# Patient Record
Sex: FEMALE | Race: WHITE | NOT HISPANIC OR LATINO | Employment: OTHER | URBAN - METROPOLITAN AREA
[De-identification: names, ages, dates, MRNs, and addresses within clinical notes are randomized per-mention and may not be internally consistent; named-entity substitution may affect disease eponyms.]

---

## 2017-01-11 RX ORDER — HYDROCODONE BITARTRATE AND ACETAMINOPHEN 10; 325 MG/1; MG/1
TABLET ORAL
Qty: 90 TABLET | Refills: 0 | Status: CANCELLED | OUTPATIENT
Start: 2017-01-11

## 2017-01-11 NOTE — TELEPHONE ENCOUNTER
----- Message from RT Thien sent at 1/10/2017  3:54 PM CST -----  Contact: pt    pt , requesting medication refill on [place], beryl.

## 2017-01-11 NOTE — TELEPHONE ENCOUNTER
----- Message from Isis Maynard sent at 1/11/2017 10:21 AM CST -----  Contact: kai   Wants pain medication, and any and all others that need refilling   Call back      .  [facility] [place] [place] - [address]  [address] [place] [address]  Phone: [phone] Fax: [phone]

## 2017-01-12 RX ORDER — HYDROCODONE BITARTRATE AND ACETAMINOPHEN 10; 325 MG/1; MG/1
1 TABLET ORAL 3 TIMES DAILY
Qty: 90 TABLET | Refills: 0 | Status: SHIPPED | OUTPATIENT
Start: 2017-01-12 | End: 2017-01-26 | Stop reason: SDUPTHER

## 2017-01-26 ENCOUNTER — OFFICE VISIT (OUTPATIENT)
Dept: FAMILY MEDICINE | Facility: CLINIC | Age: 57
End: 2017-01-26
Payer: MEDICARE

## 2017-01-26 VITALS
HEART RATE: 56 BPM | DIASTOLIC BLOOD PRESSURE: 74 MMHG | BODY MASS INDEX: 32.95 KG/M2 | SYSTOLIC BLOOD PRESSURE: 126 MMHG | HEIGHT: 65 IN | WEIGHT: 197.75 LBS

## 2017-01-26 DIAGNOSIS — G89.4 CHRONIC PAIN SYNDROME: Primary | ICD-10-CM

## 2017-01-26 DIAGNOSIS — I10 ESSENTIAL HYPERTENSION: ICD-10-CM

## 2017-01-26 DIAGNOSIS — E03.9 HYPOTHYROIDISM, UNSPECIFIED TYPE: ICD-10-CM

## 2017-01-26 DIAGNOSIS — F32.A DEPRESSION, UNSPECIFIED DEPRESSION TYPE: ICD-10-CM

## 2017-01-26 PROCEDURE — 99213 OFFICE O/P EST LOW 20 MIN: CPT | Mod: PBBFAC,PO | Performed by: FAMILY MEDICINE

## 2017-01-26 PROCEDURE — 99999 PR PBB SHADOW E&M-EST. PATIENT-LVL III: CPT | Mod: PBBFAC,,, | Performed by: FAMILY MEDICINE

## 2017-01-26 PROCEDURE — 99214 OFFICE O/P EST MOD 30 MIN: CPT | Mod: S$PBB,,, | Performed by: FAMILY MEDICINE

## 2017-01-26 RX ORDER — TRAZODONE HYDROCHLORIDE 50 MG/1
100 TABLET ORAL NIGHTLY
Qty: 60 TABLET | Refills: 3 | Status: SHIPPED | OUTPATIENT
Start: 2017-01-26 | End: 2017-06-02 | Stop reason: SDUPTHER

## 2017-01-26 RX ORDER — HYDROCODONE BITARTRATE AND ACETAMINOPHEN 10; 325 MG/1; MG/1
1 TABLET ORAL 3 TIMES DAILY
Qty: 90 TABLET | Refills: 0 | Status: SHIPPED | OUTPATIENT
Start: 2017-02-12 | End: 2017-02-07 | Stop reason: SDUPTHER

## 2017-01-26 NOTE — MR AVS SNAPSHOT
[facility]  [address] [place] [address]  Phone: [phone]  Fax: [phone]                  Mindi Villalta   2017 2:40 PM   Office Visit    Description:  Female : 1960   Provider:  Fracisco Caldwell MD   Department:  [facility]           Reason for Visit     Hypertension           Diagnoses this Visit        Comments    Hypothyroidism, unspecified type    -  Primary     Chronic pain syndrome         Essential hypertension         Depression, unspecified depression type                To Do List           Future Appointments        Provider Department Dept Phone    2017 9:20 AM Fracisco Caldwell MD [facility] [phone]      Goals (5 Years of Data)     None      Follow-Up and Disposition     Return in about 3 months (around 2017).       These Medications        Disp Refills Start End    trazodone (DESYREL) 50 MG tablet 60 tablet 3 2017     Take 2 tablets (100 mg total) by mouth every evening. - Oral    Pharmacy: [facility] [place] - [address] Ph #: [phone]       hydrocodone-acetaminophen 10-325mg (NORCO)  mg Tab 90 tablet 0 2017     Take 1 tablet by mouth 3 (three) times daily. - Oral    Pharmacy: [facility] [place] - [address] Ph #: [phone]         [facility]     [facility] Nurse Care [facility] -  Assistance  Registered nurses in the [facility] provide clinical advisement, health education, appointment booking, and other advisory services.  Call for this free service at [phone].             Medications           Message regarding Medications     Verify the changes and/or additions to your medication regime listed below are the same as discussed with your clinician today.  If any of these changes or additions are incorrect, please notify your healthcare provider.        CHANGE how you are taking  these medications     Start Taking Instead of    trazodone (DESYREL) 50 MG tablet trazodone (DESYREL) 50 MG tablet    Dosage:  Take 2 tablets (100 mg total) by mouth every evening. Dosage:  TAKE ONE TABLET BY MOUTH EVERY EVENING    Reason for Change:  Reorder            Verify that the below list of medications is an accurate representation of the medications you are currently taking.  If none reported, the list may be blank. If incorrect, please contact your healthcare provider. Carry this list with you in case of emergency.           Current Medications     buPROPion (WELLBUTRIN XL) 300 MG 24 hr tablet TAKE ONE TABLET BY MOUTH EVERY DAY    diclofenac sodium (VOLTAREN) 1 % Gel APPLY 4 MG TOPICALLY TO AFFECTED AREA FOUR TIMES A DAY    escitalopram oxalate (LEXAPRO) 20 MG tablet TAKE ONE TABLET BY MOUTH EVERY DAY FOR MOOD STABILIZER    hydrocodone-acetaminophen 10-325mg (NORCO)  mg Tab Starting on Feb 12, 2017. Take 1 tablet by mouth 3 (three) times daily.    ibuprofen (ADVIL,MOTRIN) 600 MG tablet TAKE ONE TABLET BY MOUTH EVERY 6 HOURS AS NEEDED FOR PAIN AND INFLAMMATION    levothyroxine (SYNTHROID) 100 MCG tablet Take 1 tablet (100 mcg total) by mouth once daily.    multivitamin capsule Take 1 capsule by mouth once daily.    NEXIUM 40 mg capsule TAKE ONE CAPSULE BY MOUTH EVERY DAY BEFORE BREAKFAST    pravastatin (PRAVACHOL) 20 MG tablet TAKE ONE TABLET BY MOUTH IN THE EVENING FOR CHOLESTEROL    TENS units Humaira 1 Units by Misc.(Non-Drug; Combo Route) route 4 (four) times daily as needed.    tramadol (ULTRAM) 50 mg tablet TAKE ONE TABLET BY MOUTH EVERY 6 HOURS AS NEEDED    trazodone (DESYREL) 50 MG tablet Take 2 tablets (100 mg total) by mouth every evening.    triamcinolone acetonide 0.1% (KENALOG) 0.1 % cream APPLY TO AFFECTED AREA TWO TIMES A DAY           Clinical Reference Information           Vital Signs - Last Recorded  Most recent update: 1/26/2017  2:59 PM by Cristal Seo LPN    BP Pulse Ht Wt BMI  "   126/74 (!) 56 5' 5" (1.651 m) 89.7 kg (197 lb 12 oz) 32.91 kg/m2      Blood Pressure          Most Recent Value    BP  126/74      Allergies as of 1/26/2017     Pcn [Penicillins]      Immunizations Administered on Date of Encounter - 1/26/2017     None      Orders Placed During Today's Visit     Future Labs/Procedures Expected by Expires    TSH  7/25/2017 1/26/2018      Smoking Cessation     If you would like to quit smoking:   You may be eligible for free services if you are a [place] resident and started smoking cigarettes before September 1, 1988.  Call the Smoking Cessation Trust (SCT) toll free at ([phone] or [phone].   Call 2-915-QUIT-NOW if you do not meet the above criteria.            "

## 2017-02-04 NOTE — PROGRESS NOTES
A pleasant 56-year-old female here today.  She has got a history of chronic   pain, hypertension, depression and hypothyroidism.  She does continue to smoke.    She has signed a pain contract with us and has done well with her controlled   medications.  She is not homicidal or suicidal.  She has been under more stress   than normal recently.  She is due for a recheck TSH because of her   hypothyroidism.    PAST MEDICAL, SURGICAL AND SOCIAL HISTORY:  Reviewed.    PHYSICAL EXAMINATION:  VITAL SIGNS:  Normal.  GENERAL:  A pleasant female who looks depressed today.  CHEST:  Clear.  MUSCULOSKELETAL:  No cervical or thoracic spine tenderness.  She had pain with   range of motion of her neck and upper and lower extremities.  No acute joint   swelling or acute rash.  NECK:  No thyroidal tenderness.    ASSESSMENT:  Chronic pain, hypertension, depression and hypothyroidism.    PLAN:  We will check TSH.  We will renew her controlled medications   appropriately.  She will need periodic urine toxicology screens.  She has been   very faithful with her medications so far.  We discussed caffeine, sleep,   exercise.  We discussed health maintenance and cancer screening, which she is   not interested in at this time.      BRANDT  dd: 02/04/2017 14:15:52 (CST)  td: 02/04/2017 19:22:59 (CST)  Doc ID   #[id number]  Job ID #[id number]    CC:

## 2017-02-08 NOTE — TELEPHONE ENCOUNTER
----- Message from Yin Perry sent at 2/8/2017  3:33 PM CST -----  Contact: self: [phone]  Patient says she was not able to have her prescription of Norco filled because it was not prescribed until 2/12/17. She says she needs it now because her mom is sick and she is going out of town. Pharmacy needs office to okay it today.      [facility] [place] - [address]  [address] [place] [address]  Phone: [phone] Fax: [phone]

## 2017-02-09 RX ORDER — ESOMEPRAZOLE MAGNESIUM 40 MG/1
CAPSULE, DELAYED RELEASE ORAL
Qty: 30 CAPSULE | Refills: 2 | Status: SHIPPED | OUTPATIENT
Start: 2017-02-09 | End: 2023-02-28

## 2017-02-09 RX ORDER — HYDROCODONE BITARTRATE AND ACETAMINOPHEN 10; 325 MG/1; MG/1
1 TABLET ORAL 3 TIMES DAILY
Qty: 90 TABLET | Refills: 0 | Status: SHIPPED | OUTPATIENT
Start: 2017-02-12 | End: 2017-04-11 | Stop reason: SDUPTHER

## 2017-02-09 RX ORDER — HYDROCODONE BITARTRATE AND ACETAMINOPHEN 10; 325 MG/1; MG/1
TABLET ORAL
Qty: 90 TABLET | Refills: 0 | Status: SHIPPED | OUTPATIENT
Start: 2017-02-09 | End: 2017-03-06 | Stop reason: SDUPTHER

## 2017-02-28 RX ORDER — IBUPROFEN 600 MG/1
TABLET ORAL
Qty: 60 TABLET | Refills: 3 | Status: SHIPPED | OUTPATIENT
Start: 2017-02-28 | End: 2017-07-03 | Stop reason: SDUPTHER

## 2017-03-07 NOTE — TELEPHONE ENCOUNTER
----- Message from Yin Perry sent at 3/7/2017  2:48 PM CST -----  Patient is calling to get a refill of her [place] medication sent to:      [facility] - [address]  [address] [place] [address]  Phone: [phone] Fax: [phone]    Patient asks that you call it in before 3/9/17 so that she may  all of her prescriptions at one time. Please call at [phone] when completed.

## 2017-03-08 RX ORDER — HYDROCODONE BITARTRATE AND ACETAMINOPHEN 10; 325 MG/1; MG/1
TABLET ORAL
Qty: 90 TABLET | Refills: 0 | Status: SHIPPED | OUTPATIENT
Start: 2017-03-08 | End: 2017-07-12

## 2017-04-07 RX ORDER — TRAMADOL HYDROCHLORIDE 50 MG/1
TABLET ORAL
Qty: 60 TABLET | Refills: 5 | Status: CANCELLED | OUTPATIENT
Start: 2017-04-07

## 2017-04-07 NOTE — TELEPHONE ENCOUNTER
----- Message from Yun Huggins sent at 4/7/2017  9:20 AM CDT -----  Contact:  call  [phone]    Calling to  Get a  Refill  On  [place] //// please call   [facility] - [address]  [address]  [place] [address]  Phone: [phone] Fax: [phone]

## 2017-04-11 RX ORDER — TRAMADOL HYDROCHLORIDE 50 MG/1
50 TABLET ORAL EVERY 6 HOURS PRN
Qty: 60 TABLET | Refills: 0 | Status: SHIPPED | OUTPATIENT
Start: 2017-04-11 | End: 2017-05-08 | Stop reason: SDUPTHER

## 2017-04-11 RX ORDER — HYDROCODONE BITARTRATE AND ACETAMINOPHEN 10; 325 MG/1; MG/1
1 TABLET ORAL 3 TIMES DAILY
Qty: 90 TABLET | Refills: 0 | Status: SHIPPED | OUTPATIENT
Start: 2017-04-11 | End: 2017-04-27 | Stop reason: SDUPTHER

## 2017-04-12 RX ORDER — PRAVASTATIN SODIUM 20 MG/1
TABLET ORAL
Qty: 30 TABLET | Refills: 6 | Status: SHIPPED | OUTPATIENT
Start: 2017-04-12 | End: 2017-06-02 | Stop reason: SDUPTHER

## 2017-04-12 RX ORDER — HYDROCODONE BITARTRATE AND ACETAMINOPHEN 10; 325 MG/1; MG/1
TABLET ORAL
Qty: 90 TABLET | Refills: 0 | Status: SHIPPED | OUTPATIENT
Start: 2017-04-12 | End: 2017-07-12

## 2017-04-27 ENCOUNTER — OFFICE VISIT (OUTPATIENT)
Dept: FAMILY MEDICINE | Facility: CLINIC | Age: 57
End: 2017-04-27
Payer: MEDICARE

## 2017-04-27 VITALS
OXYGEN SATURATION: 98 % | SYSTOLIC BLOOD PRESSURE: 128 MMHG | HEART RATE: 60 BPM | WEIGHT: 195.13 LBS | HEIGHT: 65 IN | DIASTOLIC BLOOD PRESSURE: 76 MMHG | BODY MASS INDEX: 32.51 KG/M2

## 2017-04-27 DIAGNOSIS — G89.4 CHRONIC PAIN SYNDROME: ICD-10-CM

## 2017-04-27 DIAGNOSIS — F32.A DEPRESSION, UNSPECIFIED DEPRESSION TYPE: ICD-10-CM

## 2017-04-27 DIAGNOSIS — I10 ESSENTIAL HYPERTENSION: ICD-10-CM

## 2017-04-27 DIAGNOSIS — E03.9 HYPOTHYROIDISM, UNSPECIFIED TYPE: Primary | ICD-10-CM

## 2017-04-27 PROCEDURE — 99999 PR PBB SHADOW E&M-EST. PATIENT-LVL III: CPT | Mod: PBBFAC,,, | Performed by: FAMILY MEDICINE

## 2017-04-27 PROCEDURE — 99213 OFFICE O/P EST LOW 20 MIN: CPT | Mod: PBBFAC,PO | Performed by: FAMILY MEDICINE

## 2017-04-27 PROCEDURE — 99214 OFFICE O/P EST MOD 30 MIN: CPT | Mod: S$PBB,,, | Performed by: FAMILY MEDICINE

## 2017-04-27 RX ORDER — HYDROCODONE BITARTRATE AND ACETAMINOPHEN 10; 325 MG/1; MG/1
1 TABLET ORAL 3 TIMES DAILY
Qty: 90 TABLET | Refills: 0 | Status: SHIPPED | OUTPATIENT
Start: 2017-05-08 | End: 2017-06-02 | Stop reason: SDUPTHER

## 2017-04-27 RX ORDER — CITALOPRAM 20 MG/1
20 TABLET, FILM COATED ORAL DAILY
Qty: 30 TABLET | Refills: 11 | Status: SHIPPED | OUTPATIENT
Start: 2017-04-27 | End: 2017-05-27

## 2017-05-08 RX ORDER — BUPROPION HYDROCHLORIDE 300 MG/1
TABLET ORAL
Qty: 30 TABLET | Refills: 6 | Status: SHIPPED | OUTPATIENT
Start: 2017-05-08 | End: 2022-04-28

## 2017-05-08 NOTE — PROGRESS NOTES
Pt bonita note to be done HISTORY OF PRESENT ILLNESS:  A 57-year-old female here today.  She has   significant depression and anxiety.  She is not homicidal or suicidal.  She does   continue to smoke.  We addressed her family issues.  She would like to try   something besides Lexapro.  She is already on Wellbutrin- and Lexapro 20.    She has chronic pain, hypothyroidism and hypertension.    PHYSICAL EXAMINATION:  A pleasant female, obviously depressed, but not homicidal   or suicidal.  Pleasant and polite.  Her chest is clear.  No cervical, thoracic   or lumbar spine tenderness.  She had pain with range of motion of her neck,   back, upper and lower extremities consistent probably with fibromyalgia.    ASSESSMENT:  Hypothyroidism, chronic pain, hypertension and depression.    PLAN:  We will change the Lexapro to Celexa 20 mg.  We discussed caffeine and   exercise.  Counseling as needed.  She is signed a controlled medication   contract.  Hopefully, she will improve with the Celexa.      NETTA/SUSAN  dd: 05/09/2017 07:49:15 (CDT)  td: 05/09/2017 12:15:18 (CDT)  Doc ID   #[id number]  Job ID #[id number]    CC:

## 2017-05-09 RX ORDER — TRAMADOL HYDROCHLORIDE 50 MG/1
TABLET ORAL
Qty: 60 TABLET | Refills: 0 | Status: SHIPPED | OUTPATIENT
Start: 2017-05-09 | End: 2021-03-22 | Stop reason: ALTCHOICE

## 2017-05-11 ENCOUNTER — OFFICE VISIT (OUTPATIENT)
Dept: PODIATRY | Facility: CLINIC | Age: 57
End: 2017-05-11
Payer: MEDICARE

## 2017-05-11 VITALS — WEIGHT: 194.69 LBS | HEIGHT: 65 IN | BODY MASS INDEX: 32.44 KG/M2

## 2017-05-11 DIAGNOSIS — M76.71 PERONEAL TENDINITIS OF LOWER LEG, RIGHT: ICD-10-CM

## 2017-05-11 DIAGNOSIS — M79.672 FOOT PAIN, BILATERAL: Primary | ICD-10-CM

## 2017-05-11 DIAGNOSIS — M77.8 EXTENSOR TENDINITIS OF FOOT: ICD-10-CM

## 2017-05-11 DIAGNOSIS — M79.671 FOOT PAIN, BILATERAL: Primary | ICD-10-CM

## 2017-05-11 DIAGNOSIS — M20.42 HAMMER TOES OF BOTH FEET: ICD-10-CM

## 2017-05-11 DIAGNOSIS — M20.41 HAMMER TOES OF BOTH FEET: ICD-10-CM

## 2017-05-11 DIAGNOSIS — M21.41 PES PLANUS OF BOTH FEET: ICD-10-CM

## 2017-05-11 DIAGNOSIS — M21.42 PES PLANUS OF BOTH FEET: ICD-10-CM

## 2017-05-11 PROCEDURE — 99214 OFFICE O/P EST MOD 30 MIN: CPT | Mod: S$PBB,,, | Performed by: PODIATRIST

## 2017-05-11 PROCEDURE — 99213 OFFICE O/P EST LOW 20 MIN: CPT | Mod: PBBFAC,PO | Performed by: PODIATRIST

## 2017-05-11 PROCEDURE — 99999 PR PBB SHADOW E&M-EST. PATIENT-LVL III: CPT | Mod: PBBFAC,,, | Performed by: PODIATRIST

## 2017-05-11 RX ORDER — METHYLPREDNISOLONE 4 MG/1
TABLET ORAL
Qty: 1 PACKAGE | Refills: 0 | Status: SHIPPED | OUTPATIENT
Start: 2017-05-11 | End: 2017-06-01

## 2017-05-11 NOTE — MR AVS SNAPSHOT
[facility]  [address] [place] [address]  Phone: [phone]                  Mindi Villalta   2017 10:00 AM   Office Visit    Description:  Female : 1960   Provider:  Juan Bill DPM   Department:  [facility]           Reason for Visit     Foot Pain           Diagnoses this Visit        Comments    Foot pain, bilateral    -  Primary     Pes planus of both feet         Hammer toes of both feet         Extensor tendinitis of foot         Peroneal tendinitis of lower leg, right                To Do List           Future Appointments        Provider Department Dept Phone    2017 8:20 AM Juan Bill DPM [facility] [phone]    2017 9:30 AM Poonam Carbajal NP [facility] [phone]      Goals (5 Years of Data)     None      Follow-Up and Disposition     Return in about 2 months (around 2017).       These Medications        Disp Refills Start End    methylPREDNISolone (MEDROL DOSEPACK) 4 mg tablet 1 Package 0 2017    use as directed    Pharmacy: [facility] - [place] - [address] Ph #: [phone]         [facility]     [facility] Nurse Care [facility] -  Assistance  Unless otherwise directed by your provider, please contact [facility], our nurse care line that is available for  assistance.     Registered nurses in the [facility] provide: appointment scheduling, clinical advisement, health education, and other advisory services.  Call: [phone] (toll free)               Medications           Message regarding Medications     Verify the changes and/or additions to your medication regime listed below are the same as discussed with your clinician today.  If any of these changes or additions are incorrect, please notify your healthcare provider.        START taking these NEW medications        Refills    methylPREDNISolone (MEDROL  DOSEPACK) 4 mg tablet 0    Sig: use as directed    Class: Normal           Verify that the below list of medications is an accurate representation of the medications you are currently taking.  If none reported, the list may be blank. If incorrect, please contact your healthcare provider. Carry this list with you in case of emergency.           Current Medications     buPROPion (WELLBUTRIN XL) 300 MG 24 hr tablet TAKE ONE TABLET BY MOUTH EVERY DAY    citalopram (CELEXA) 20 MG tablet Take 1 tablet (20 mg total) by mouth once daily.    diclofenac sodium (VOLTAREN) 1 % Gel APPLY 4 MG TOPICALLY TO AFFECTED AREA FOUR TIMES A DAY    escitalopram oxalate (LEXAPRO) 20 MG tablet TAKE ONE TABLET BY MOUTH EVERY DAY FOR MOOD STABILIZER    hydrocodone-acetaminophen 10-325mg (NORCO)  mg Tab TAKE ONE TABLET BY MOUTH THREE TIMES A DAY    hydrocodone-acetaminophen 10-325mg (NORCO)  mg Tab TAKE 1 TABLET BY MOUTH THREE TIMES A DAY    hydrocodone-acetaminophen 10-325mg (NORCO)  mg Tab Take 1 tablet by mouth 3 (three) times daily.    ibuprofen (ADVIL,MOTRIN) 600 MG tablet TAKE ONE TABLET BY MOUTH EVERY 6 HOURS AS NEEDED FOR PAIN AND INFLAMMATION    levothyroxine (SYNTHROID) 100 MCG tablet Take 1 tablet (100 mcg total) by mouth once daily.    multivitamin capsule Take 1 capsule by mouth once daily.    NEXIUM 40 mg capsule TAKE ONE CAPSULE BY MOUTH EVERY DAY BEFORE BREAKFAST    pravastatin (PRAVACHOL) 20 MG tablet TAKE ONE TABLET BY MOUTH EVERY EVENING FOR CHOLESTEROL    TENS units Humaira 1 Units by Misc.(Non-Drug; Combo Route) route 4 (four) times daily as needed.    tramadol (ULTRAM) 50 mg tablet TAKE ONE TABLET BY MOUTH EVERY 6 HOURS AS NEEDED    trazodone (DESYREL) 50 MG tablet Take 2 tablets (100 mg total) by mouth every evening.    triamcinolone acetonide 0.1% (KENALOG) 0.1 % cream APPLY TO AFFECTED AREA TWO TIMES A DAY    methylPREDNISolone (MEDROL DOSEPACK) 4 mg tablet use as directed           Clinical Reference  "Information           Your Vitals Were     Height Weight BMI          5' 5" (1.651 m) 88.3 kg (194 lb 10.7 oz) 32.39 kg/m2        Allergies as of 5/11/2017     Pcn [Penicillins]      Immunizations Administered on Date of Encounter - 5/11/2017     None      Instructions    Recommend applying ice to the affected foot up to 20 minutes daily.    Continue applying aspercream to the feet 2-4 times daily.    Continue taking ibuprofen to address inflammation.    Sandals: Vionic or Spenco    Avoid barefoot walking, flip flops, and sandals as this will exacerbate symptoms.       Smoking Cessation     If you would like to quit smoking:   You may be eligible for free services if you are a [place] resident and started smoking cigarettes before September 1, 1988.  Call the Smoking Cessation Trust ([facility]) toll free at [phone] or [phone] [phone].   Call 1-800-QUIT-NOW if you do not meet the above criteria.   Contact us via email: [email or web address]   View our website for more information: [email or web address]        Language Assistance Services     ATTENTION: Language assistance services are available, free of charge. Please call [phone].      ATENCIÓN: Si habla español, tiene a mclain disposición servicios gratuitos de asistencia lingüística. Llame al [phone].     CHÚ Ý: N?u b?n nói Ti?ng Vi?t, có các d?ch v? h? tr? ngôn ng? mi?n phí dành cho b?n. G?i s? [phone].         [facility] complies with applicable Federal civil rights laws and does not discriminate on the basis of race, color, national origin, age, disability, or sex.        "

## 2017-05-11 NOTE — PATIENT INSTRUCTIONS
Recommend applying ice to the affected foot up to 20 minutes daily.    Continue applying aspercream to the feet 2-4 times daily.    Continue taking ibuprofen to address inflammation.    Sandals: Vionic or Spenco    Avoid barefoot walking, flip flops, and sandals as this will exacerbate symptoms.

## 2017-05-11 NOTE — PROGRESS NOTES
Subjective:      Patient ID: Mindi Villalta is a 57 y.o. female.    Chief Complaint: Foot Pain (Left  top of foot pain, Rt foot side of foot painful)  Patient presents to clinic with the chief complaint of pain in the Rt. Lateral foot and Lt. Dorsal foot for months.  States that pain symptoms are exacerbated with direct weight bearing and progressively worsen throughout the day.  Symptoms are alleviated only with rest.  Currently describes pain as sharp and rates as an 8/10.  Relates primarily walking either barefoot or in flip flops, as she is unable to tolerate wearing closed toe shoes.  Denies injury to bilateral foot.  Denies any additional pedal complaints.      Past Medical History:   Diagnosis Date    Arthritis     back, chronic, wears TENS unit    Bradycardia July 2014    improved when thyroid med increased    Colon cancer screening 1/12/2015    Depression     GERD (gastroesophageal reflux disease)     occasional    Hyperlipidemia     Hypertension     on no medication for it    Smoker     Thyroid disease     hypothyroidism       Past Surgical History:   Procedure Laterality Date    COLONOSCOPY  1/12/2015    FOOT SURGERY      left foot great toe, bunion; right foot bunion 7/2014    FRACTURE SURGERY      right elbow    TONSILLECTOMY      TUBAL LIGATION         Family History   Problem Relation Age of Onset    Diabetes Mother     Arthritis Father     Heart disease Father     Stroke Father     Diabetes Maternal Aunt        Social History     Social History    Marital status:      Spouse name: N/A    Number of children: N/A    Years of education: N/A     Social History Main Topics    Smoking status: Current Every Day Smoker     Packs/day: 0.50     Types: Cigarettes    Smokeless tobacco: Never Used      Comment: smoke a pack of cigarettes every 2 days    Alcohol use Yes      Comment: rarely    Drug use: Yes     Special: Hydrocodone    Sexual activity: Not Asked     Other Topics  Concern    None     Social History Narrative       Current Outpatient Prescriptions   Medication Sig Dispense Refill    buPROPion (WELLBUTRIN XL) 300 MG 24 hr tablet TAKE ONE TABLET BY MOUTH EVERY DAY 30 tablet 6    citalopram (CELEXA) 20 MG tablet Take 1 tablet (20 mg total) by mouth once daily. 30 tablet 11    diclofenac sodium (VOLTAREN) 1 % Gel APPLY 4 MG TOPICALLY TO AFFECTED AREA FOUR TIMES A  g 3    escitalopram oxalate (LEXAPRO) 20 MG tablet TAKE ONE TABLET BY MOUTH EVERY DAY FOR MOOD STABILIZER 30 tablet 11    hydrocodone-acetaminophen 10-325mg (NORCO)  mg Tab TAKE ONE TABLET BY MOUTH THREE TIMES A DAY 90 tablet 0    hydrocodone-acetaminophen 10-325mg (NORCO)  mg Tab TAKE 1 TABLET BY MOUTH THREE TIMES A DAY 90 tablet 0    hydrocodone-acetaminophen 10-325mg (NORCO)  mg Tab Take 1 tablet by mouth 3 (three) times daily. 90 tablet 0    ibuprofen (ADVIL,MOTRIN) 600 MG tablet TAKE ONE TABLET BY MOUTH EVERY 6 HOURS AS NEEDED FOR PAIN AND INFLAMMATION 60 tablet 3    levothyroxine (SYNTHROID) 100 MCG tablet Take 1 tablet (100 mcg total) by mouth once daily. 30 tablet 11    multivitamin capsule Take 1 capsule by mouth once daily.      NEXIUM 40 mg capsule TAKE ONE CAPSULE BY MOUTH EVERY DAY BEFORE BREAKFAST 30 capsule 2    pravastatin (PRAVACHOL) 20 MG tablet TAKE ONE TABLET BY MOUTH EVERY EVENING FOR CHOLESTEROL 30 tablet 6    TENS units Humaira 1 Units by Misc.(Non-Drug; Combo Route) route 4 (four) times daily as needed. 1 Device 0    tramadol (ULTRAM) 50 mg tablet TAKE ONE TABLET BY MOUTH EVERY 6 HOURS AS NEEDED 60 tablet 0    trazodone (DESYREL) 50 MG tablet Take 2 tablets (100 mg total) by mouth every evening. 60 tablet 3    triamcinolone acetonide 0.1% (KENALOG) 0.1 % cream APPLY TO AFFECTED AREA TWO TIMES A  Bottle 1    methylPREDNISolone (MEDROL DOSEPACK) 4 mg tablet use as directed 1 Package 0     No current facility-administered medications for this visit.         Review of patient's allergies indicates:   Allergen Reactions    Pcn [penicillins] Rash         Review of Systems   Constitution: Negative for chills and fever.   Cardiovascular: Negative for claudication and leg swelling.   Skin: Negative for color change and nail changes.   Musculoskeletal: Positive for arthritis, back pain, joint pain and myalgias.   Neurological: Negative for numbness and paresthesias.   Psychiatric/Behavioral: Negative for altered mental status.           Objective:      Physical Exam   Constitutional: She is oriented to person, place, and time. She appears well-developed and well-nourished. No distress.   Cardiovascular:   Pulses:       Dorsalis pedis pulses are 2+ on the right side, and 2+ on the left side.        Posterior tibial pulses are 2+ on the right side, and 2+ on the left side.   CFT <3 seconds bilateral.  Pedal hair growth present bilateral.   No varicosities noted bilateral.  No bilateral lower extremity edema.   Musculoskeletal: She exhibits tenderness. She exhibits no edema.   Muscle strength 5/5 in all muscle groups bilateral.   Bilateral pes planus foot type.  Lt. Hallux varus. Semi-rigid contracture of toes 2-5 bilateral.  Rectus alignment of the Rt. Hallux.  Pain with both active and passive extension of toes and with palpation of the extensor tendons about the Lt. Midfoot.  Pain with palpation of the Rt. Peroneus brevis tendon at insertion, however, no pain with active or passive eversion or inversion.    Neurological: She is oriented to person, place, and time. She has normal strength. No sensory deficit.   Light touch intact bilateral.     Skin: Skin is warm, dry and intact. No abrasion, no bruising, no burn, no ecchymosis, no laceration, no lesion, no petechiae and no rash noted. She is not diaphoretic. No cyanosis or erythema. No pallor. Nails show no clubbing.   Pedal skin has normal turgor, temperature, and texture bilateral.  Toenails x 10 appear normotrophic.   Cicatrix overlying the Rt. 1st ray.   Examination of the skin reveals no evidence of significant maceration, rashes, open lesions, suspicious appearing nevi or other concerning lesions.              Assessment:       Encounter Diagnoses   Name Primary?    Foot pain, bilateral Yes    Pes planus of both feet     Hammer toes of both feet     Extensor tendinitis of foot     Peroneal tendinitis of lower leg, right          Plan:       Mindi was seen today for foot pain.    Diagnoses and all orders for this visit:    Foot pain, bilateral    Pes planus of both feet    Hammer toes of both feet    Extensor tendinitis of foot  -     methylPREDNISolone (MEDROL DOSEPACK) 4 mg tablet; use as directed    Peroneal tendinitis of lower leg, right  -     methylPREDNISolone (MEDROL DOSEPACK) 4 mg tablet; use as directed      I counseled the patient on her conditions, their implications and medical management.    Advised to take an oral nsaid daily to help with inflammation.    Prescription written for a medrol dose pack.    Advised to apply voltaren gel to sites of tendonitis 3-4 times daily.    Explained that bilateral symptoms are likely due to lack of medial longitudinal arch support associated with her flat foot deformity.      Discussed avoidance of barefoot walking, flats, and flip flops as this will exacerbate symptoms.    Discussed wearing shoes with support and insoles to better control bilateral pronation x 4-6 weeks.      RTC in 2 months for follow up.    Juan Bill DPM

## 2017-05-15 RX ORDER — TRAMADOL HYDROCHLORIDE 50 MG/1
TABLET ORAL
Qty: 60 TABLET | Refills: 5 | Status: SHIPPED | OUTPATIENT
Start: 2017-05-15 | End: 2017-10-02 | Stop reason: SDUPTHER

## 2017-06-02 NOTE — TELEPHONE ENCOUNTER
----- Message from Maryjane Boateng sent at 6/2/2017 10:29 AM CDT -----  Contact: self  Patient is requesting a refill on Norco      Please send to    [facility] [facility] [place] - [address]  [address] [place] [address]  Phone: [phone] Fax: [phone]

## 2017-06-06 RX ORDER — PRAVASTATIN SODIUM 20 MG/1
TABLET ORAL
Qty: 30 TABLET | Refills: 1 | Status: SHIPPED | OUTPATIENT
Start: 2017-06-06 | End: 2019-07-21 | Stop reason: ALTCHOICE

## 2017-06-06 RX ORDER — HYDROCODONE BITARTRATE AND ACETAMINOPHEN 10; 325 MG/1; MG/1
1 TABLET ORAL 3 TIMES DAILY
Qty: 90 TABLET | Refills: 0 | Status: SHIPPED | OUTPATIENT
Start: 2017-06-06 | End: 2017-07-12

## 2017-06-06 RX ORDER — HYDROCODONE BITARTRATE AND ACETAMINOPHEN 10; 325 MG/1; MG/1
TABLET ORAL
Qty: 90 TABLET | Refills: 0 | Status: SHIPPED | OUTPATIENT
Start: 2017-06-06 | End: 2017-07-12 | Stop reason: SDUPTHER

## 2017-06-06 RX ORDER — TRAZODONE HYDROCHLORIDE 50 MG/1
TABLET ORAL
Qty: 60 TABLET | Refills: 3 | Status: SHIPPED | OUTPATIENT
Start: 2017-06-06 | End: 2022-06-29 | Stop reason: SDUPTHER

## 2017-07-03 RX ORDER — IBUPROFEN 600 MG/1
TABLET ORAL
Qty: 60 TABLET | Refills: 0 | Status: SHIPPED | OUTPATIENT
Start: 2017-07-03 | End: 2019-07-21 | Stop reason: ALTCHOICE

## 2017-07-03 RX ORDER — TRAMADOL HYDROCHLORIDE 50 MG/1
50 TABLET ORAL EVERY 6 HOURS PRN
Qty: 60 TABLET | Refills: 0 | OUTPATIENT
Start: 2017-07-03

## 2017-07-03 RX ORDER — HYDROCODONE BITARTRATE AND ACETAMINOPHEN 10; 325 MG/1; MG/1
1 TABLET ORAL 3 TIMES DAILY
Qty: 30 TABLET | Refills: 0 | OUTPATIENT
Start: 2017-07-03

## 2017-07-03 NOTE — TELEPHONE ENCOUNTER
----- Message from Otilia Kaufman sent at 7/3/2017 11:06 AM CDT -----  Contact: patient  Patient calling in regards to requesting a refill for [place] and Tramadol. Please call when sent in. Please advise.  Call back   Thanks!  [facility] [place] - [address]  [address] [place] [address]  Phone: [phone] Fax: [phone]

## 2017-07-05 RX ORDER — TRAMADOL HYDROCHLORIDE 50 MG/1
50 TABLET ORAL EVERY 6 HOURS PRN
Qty: 60 TABLET | Refills: 5 | OUTPATIENT
Start: 2017-07-05

## 2017-07-05 RX ORDER — HYDROCODONE BITARTRATE AND ACETAMINOPHEN 10; 325 MG/1; MG/1
1 TABLET ORAL 3 TIMES DAILY
Qty: 90 TABLET | Refills: 0 | OUTPATIENT
Start: 2017-07-05

## 2017-07-05 NOTE — TELEPHONE ENCOUNTER
"Spoke with pt, notified of reason for refill refusal, offered to attempt to reschedule to sooner appt. Pt is very upset about "the way things have been handled. I was told there would be no problem getting my refills and finding a new doctor that would continue to refill my medications." Pt then disconnected call.   "

## 2017-07-05 NOTE — TELEPHONE ENCOUNTER
Refill request/Dr. Caldwell, spoke with patient and notified her that request was sent to on-call provider and that refill would be for 30 days and she would need to establish care with physician that is able to continue to provide her pain medication due to clinic being at capacity with new pain patients, patient was upset but verbalized understanding.

## 2017-07-07 ENCOUNTER — TELEPHONE (OUTPATIENT)
Dept: FAMILY MEDICINE | Facility: CLINIC | Age: 57
End: 2017-07-07

## 2017-07-12 ENCOUNTER — OFFICE VISIT (OUTPATIENT)
Dept: PAIN MEDICINE | Facility: CLINIC | Age: 57
End: 2017-07-12
Payer: MEDICARE

## 2017-07-12 ENCOUNTER — OFFICE VISIT (OUTPATIENT)
Dept: PODIATRY | Facility: CLINIC | Age: 57
End: 2017-07-12
Payer: MEDICARE

## 2017-07-12 VITALS — HEIGHT: 65 IN | BODY MASS INDEX: 31.96 KG/M2 | WEIGHT: 191.81 LBS

## 2017-07-12 VITALS
HEART RATE: 68 BPM | WEIGHT: 191.81 LBS | SYSTOLIC BLOOD PRESSURE: 160 MMHG | RESPIRATION RATE: 20 BRPM | DIASTOLIC BLOOD PRESSURE: 90 MMHG | TEMPERATURE: 98 F | HEIGHT: 66 IN | BODY MASS INDEX: 30.82 KG/M2

## 2017-07-12 DIAGNOSIS — Z72.0 TOBACCO ABUSE: ICD-10-CM

## 2017-07-12 DIAGNOSIS — M51.36 DDD (DEGENERATIVE DISC DISEASE), LUMBAR: Primary | ICD-10-CM

## 2017-07-12 DIAGNOSIS — M21.42 PES PLANUS OF BOTH FEET: ICD-10-CM

## 2017-07-12 DIAGNOSIS — M47.816 LUMBAR FACET ARTHROPATHY: ICD-10-CM

## 2017-07-12 DIAGNOSIS — M21.41 PES PLANUS OF BOTH FEET: ICD-10-CM

## 2017-07-12 DIAGNOSIS — M76.71 PERONEAL TENDINITIS OF LOWER LEG, RIGHT: ICD-10-CM

## 2017-07-12 DIAGNOSIS — G89.29 CHRONIC LOW BACK PAIN, UNSPECIFIED BACK PAIN LATERALITY, WITH SCIATICA PRESENCE UNSPECIFIED: Primary | ICD-10-CM

## 2017-07-12 DIAGNOSIS — R20.2 PARESTHESIAS: ICD-10-CM

## 2017-07-12 DIAGNOSIS — M77.8 EXTENSOR TENDINITIS OF FOOT: ICD-10-CM

## 2017-07-12 DIAGNOSIS — M54.5 CHRONIC LOW BACK PAIN, UNSPECIFIED BACK PAIN LATERALITY, WITH SCIATICA PRESENCE UNSPECIFIED: Primary | ICD-10-CM

## 2017-07-12 PROCEDURE — 99999 PR PBB SHADOW E&M-EST. PATIENT-LVL III: CPT | Mod: PBBFAC,,, | Performed by: PODIATRIST

## 2017-07-12 PROCEDURE — 99213 OFFICE O/P EST LOW 20 MIN: CPT | Mod: S$PBB,,, | Performed by: PODIATRIST

## 2017-07-12 PROCEDURE — 99214 OFFICE O/P EST MOD 30 MIN: CPT | Mod: PBBFAC,PO | Performed by: ANESTHESIOLOGY

## 2017-07-12 PROCEDURE — 99999 PR PBB SHADOW E&M-EST. PATIENT-LVL IV: CPT | Mod: PBBFAC,,, | Performed by: ANESTHESIOLOGY

## 2017-07-12 PROCEDURE — 99204 OFFICE O/P NEW MOD 45 MIN: CPT | Mod: S$PBB,,, | Performed by: ANESTHESIOLOGY

## 2017-07-12 RX ORDER — DICLOFENAC SODIUM 10 MG/G
GEL TOPICAL
Qty: 500 G | Refills: 5 | Status: SHIPPED | OUTPATIENT
Start: 2017-07-12 | End: 2022-04-28 | Stop reason: SDUPTHER

## 2017-07-12 RX ORDER — HYDROCODONE BITARTRATE AND ACETAMINOPHEN 10; 325 MG/1; MG/1
1 TABLET ORAL 3 TIMES DAILY PRN
Qty: 90 TABLET | Refills: 0 | Status: SHIPPED | OUTPATIENT
Start: 2017-07-12 | End: 2017-08-11

## 2017-07-12 NOTE — PROGRESS NOTES
"CC:Back pain    HPI: The patient is a 57-year-old woman with a history of arthritis who initially presented in referral from Dr. Caldwell for continued low back pain.  She states that she has had low back pain with radicular leg pain for the last 15 years, states that she has tried physical therapy, anti-neuropathic medication, interventional procedures all without relief.  She presents today after receiving hydrocodone 10/325 3 times a day for the last 4 years from her primary care doctor, Dr. Caldwell and states that she was doing well with this.  He recently retired and she has not established with a new primary care physician, states that she has been out of her hydrocodone for the last 7 days.  She denies any withdrawal symptoms but she states that she is not able to do the work around her house, do yard work, she is emotional, increased anxiety and she is tearful at the visit today.  She had seen podiatry this morning and I assume she must have asked for pain medication because she was then placed on my schedule today where I had a cancellation.  She reports that her pain is across her bilateral low back, bilateral hips, worse with standing and walking, worse with activity.  States that the only thing that helps his pain medication.  She has not done any recent physical therapy, states that she had possibly done PT about 3 years ago at [facility].    Intervention history: She states that she has an allergy to any steroid injections.  She states that physical therapy has made her worse.  She does not do any type of exercise on a regular basis. She states that she had been taking long-acting morphine while in [place], but claims that she had asked "not to be put on this medication ".    ROS:She reports weight gain, back pain, depression and difficulty sleeping.  Balance of review of systems is negative.    Medical, surgical, family and social history reviewed elsewhere in record.    Medications/Allergies: See med " "card    Vitals:    07/12/17 0947   BP: (!) 160/90   Pulse: 68   Resp: 20   Temp: 98.4 °F (36.9 °C)   TempSrc: Oral   Weight: 87 kg (191 lb 12.8 oz)   Height: 5' 5.5" (1.664 m)   PainSc:   8   PainLoc: Back       Physical exam:  Gen: A and O x3, smells of stale tobacco, tearful  Skin: No rashes or obvious lesions  HEENT: PERRLA,  CVS: Regular rate and rhythm, normal S1 and S2, no murmurs.  Resp: Clear to auscultation bilaterally, no wheezes or rales.  Musculoskeletal: Able to heel walk, toe walk. No antalgic gait.     Neuro:  Lower extremities: 5/5 strength bilaterally  Reflexes: Patellar 2+, Achilles 2+.  Sensory:  Intact and symmetrical to light touch and pinprick in L2-S1 dermatomes bilaterally.  Lumbar spine:  Lumbar spine: ROM is decreased with flexion and extension with reported increased pain with each maneuver.  Elijah's test causes back pain only.  Supine straight leg raise is positive for back pain only.   Myofascial exam: Tenderness to palpation across bilateral low back.    Imaging:  Lumbar MRI 11/2012  Findings:   The lumbar vertebral bodies show normal height, signal intensity, and alignment with no evidence of acute compression fracture or pathologic marrow replacement process. There is degenerative disk desiccation present at L2-L3 and L3-L4. The conus   medullaris terminates at T12-L1. The visualized retroperitoneal/abdominal soft tissue structures show no significant abnormalities.  T12-L1: No central canal or neuroforaminal stenosis. No disc protrusion or extrusion.  L1-L2: There is a broad disk bulge present. No central canal or neuroforaminal stenosis. No disc protrusion or extrusion.  L2-L3: There is a broad disk bulge present. No central canal or neuroforaminal stenosis. No disc protrusion or extrusion.  L3-L4: No central canal or neuroforaminal stenosis. No disc protrusion or extrusion.  L4-L5: There is ligamentum flavum thickening and facet arthropathy. There is mild proximal right neural " foraminal stenosis. No central canal stenosis or left neuroforaminal stenosis.  L5-S1: No central canal or neuroforaminal stenosis. No disc protrusion or extrusion.      Assessment:  The patient is a 57-year-old woman with a history of arthritis who presents in referral from Dr. Caldwell for continued low back pain.      1. DDD (degenerative disc disease), lumbar     2. Lumbar facet arthropathy     3. Tobacco abuse         Plan:  1.  I explained that I could give her a refill of medication for the next month but she has been getting this through primary care and I cannot take over all of the prescription writing for a retiring primary care physician.  Dr. Caldwell has been prescribing this medication for the last 4 years, has done urine drug screens and they have been negative for any illicit substances so I think it is reasonable for her to continue this medication.  I explained that if the medication is not helping enough in the future, we may need to consider other options for treating her pain including procedural interventions.  2.  I did recommend the importance of exercise and weight loss, smoking cessation.

## 2017-07-12 NOTE — LETTER
July 12, 2017      Juan Bill DPM  [address] [facility] [address] [place] [address]           [facility]  [address] [facility] [address] [place] [address]  Phone: [phone]          Patient: Mindi Villalta   MR Number: [id number]   YOB: 1960   Date of Visit: 7/12/2017       Dear Dr. Juan Bill:    Thank you for referring Mindi Villalta to me for evaluation. Attached you will find relevant portions of my assessment and plan of care.    If you have questions, please do not hesitate to call me. I look forward to following Mindi Villalta along with you.    Sincerely,    Roland Corrales MD    Enclosure  CC:  No Recipients    If you would like to receive this communication electronically, please contact [email or web address] or [phone] to request more information on [employer] Link access.    For providers and/or their staff who would like to refer a patient to [facility], please contact us through our one-stop-shop provider referral line, [facility], at [phone].    If you feel you have received this communication in error or would no longer like to receive these types of communications, please e-mail [email or web address]

## 2017-07-13 NOTE — PROGRESS NOTES
Subjective:      Patient ID: Mindi Villalta is a 57 y.o. female.    Chief Complaint: Foot Pain (bottom of Rt foot  )  Patient presents to clinic with the chief complaint of continued pain in the Rt. Lateral foot and Lt. Dorsal foot.  Relates hypersensitivity in the feet and states she is unable to wear supportive closed toe shoes as a result.  States the pain is intense, constant and seeking some relief.  Relates applying voltaren gel to the feet up to 4 times daily with considerable relief.  Requests a refill with today's visit.  Appears upset as she is unable to perform her usual activities around the house (yard work, etc).  States that pain symptoms are exacerbated with direct weight bearing and progressively worsen throughout the day.  Symptoms are alleviated only with rest.  Currently describes pain as sharp and rates as an 8/10.  Also, relates having chronic back pain for years.  States that she saw Pain Management at [facility] but did not think it was Dr. Corrales.  States that previous MRIs were positive for degenerative changes in the spine.  States that as a result, she has radiating pain from her lower back to her legs.  Is interested in seeing Pain Management to address this issue.  Denies any additional pedal complaints.      Past Medical History:   Diagnosis Date    Arthritis     back, chronic, wears TENS unit    Bradycardia July 2014    improved when thyroid med increased    Colon cancer screening 1/12/2015    Depression     GERD (gastroesophageal reflux disease)     occasional    Hyperlipidemia     Hypertension     on no medication for it    Smoker     Thyroid disease     hypothyroidism       Past Surgical History:   Procedure Laterality Date    COLONOSCOPY  1/12/2015    FOOT SURGERY      left foot great toe, bunion; right foot bunion 7/2014    FRACTURE SURGERY      right elbow    TONSILLECTOMY      TUBAL LIGATION         Family History   Problem Relation Age of Onset    Diabetes Mother      Arthritis Father     Heart disease Father     Stroke Father     Diabetes Maternal Aunt        Social History     Social History    Marital status:      Spouse name: N/A    Number of children: N/A    Years of education: N/A     Social History Main Topics    Smoking status: Current Every Day Smoker     Packs/day: 0.50     Types: Cigarettes    Smokeless tobacco: Never Used      Comment: smoke a pack of cigarettes every 2 days    Alcohol use Yes      Comment: rarely    Drug use:      Types: Hydrocodone    Sexual activity: Not on file     Other Topics Concern    Not on file     Social History Narrative    No narrative on file       Current Outpatient Prescriptions   Medication Sig Dispense Refill    buPROPion (WELLBUTRIN XL) 300 MG 24 hr tablet TAKE ONE TABLET BY MOUTH EVERY DAY 30 tablet 6    diclofenac sodium (VOLTAREN) 1 % Gel APPLY 4 MG TOPICALLY TO AFFECTED AREA FOUR TIMES A  g 5    escitalopram oxalate (LEXAPRO) 20 MG tablet TAKE ONE TABLET BY MOUTH EVERY DAY FOR MOOD STABILIZER 30 tablet 11    ibuprofen (ADVIL,MOTRIN) 600 MG tablet TAKE ONE TABLET BY MOUTH EVERY 6 HOURS AS NEEDED FOR PAIN AND INFLAMMATION 60 tablet 0    levothyroxine (SYNTHROID) 100 MCG tablet Take 1 tablet (100 mcg total) by mouth once daily. 30 tablet 11    multivitamin capsule Take 1 capsule by mouth once daily.      NEXIUM 40 mg capsule TAKE ONE CAPSULE BY MOUTH EVERY DAY BEFORE BREAKFAST 30 capsule 2    pravastatin (PRAVACHOL) 20 MG tablet TAKE ONE TABLET BY MOUTH EVERY EVENING FOR CHOLESTEROL 30 tablet 1    TENS units Humaira 1 Units by Misc.(Non-Drug; Combo Route) route 4 (four) times daily as needed. 1 Device 0    tramadol (ULTRAM) 50 mg tablet TAKE ONE TABLET BY MOUTH EVERY 6 HOURS AS NEEDED 60 tablet 0    tramadol (ULTRAM) 50 mg tablet TAKE ONE TABLET BY MOUTH EVERY 6 HOURS AS NEEDED 60 tablet 5    trazodone (DESYREL) 50 MG tablet TAKE TWO TABLETS BY MOUTH EVERY EVENING 60 tablet 3     triamcinolone acetonide 0.1% (KENALOG) 0.1 % cream APPLY TO AFFECTED AREA TWO TIMES A  Bottle 1    citalopram (CELEXA) 20 MG tablet Take 1 tablet (20 mg total) by mouth once daily. 30 tablet 11    hydrocodone-acetaminophen 10-325mg (NORCO)  mg Tab Take 1 tablet by mouth 3 (three) times daily as needed for Pain. 90 tablet 0     No current facility-administered medications for this visit.        Review of patient's allergies indicates:   Allergen Reactions    Pcn [penicillins] Rash         Review of Systems   Constitution: Negative for chills and fever.   Cardiovascular: Negative for claudication and leg swelling.   Skin: Negative for color change and nail changes.   Musculoskeletal: Positive for arthritis, back pain, joint pain and myalgias.   Neurological: Negative for numbness and paresthesias.   Psychiatric/Behavioral: Negative for altered mental status.           Objective:      Physical Exam   Constitutional: She is oriented to person, place, and time. She appears well-developed and well-nourished. No distress.   Cardiovascular:   Pulses:       Dorsalis pedis pulses are 2+ on the right side, and 2+ on the left side.        Posterior tibial pulses are 2+ on the right side, and 2+ on the left side.   CFT <3 seconds bilateral.  Pedal hair growth present bilateral.   No varicosities noted bilateral.  No bilateral lower extremity edema.   Musculoskeletal: She exhibits tenderness. She exhibits no edema.   Muscle strength 5/5 in all muscle groups bilateral.   Bilateral pes planus foot type.  Lt. Hallux varus. Semi-rigid contracture of toes 2-5 bilateral.  Rectus alignment of the Rt. Hallux.  Pain with both active and passive extension of toes and with palpation of the extensor tendons about the Lt. Midfoot.  Pain with palpation of the Rt. Peroneus brevis tendon along its length.  Tendonitis appears worse with today's exam.   Neurological: She is oriented to person, place, and time. She has normal  strength. No sensory deficit.   Light touch hypersensitivity bilateral.    (+) Tinel sign of bilateral lower extremity.       Skin: Skin is warm, dry and intact. No abrasion, no bruising, no burn, no ecchymosis, no laceration, no lesion, no petechiae and no rash noted. She is not diaphoretic. No cyanosis or erythema. No pallor. Nails show no clubbing.   Pedal skin has normal turgor, temperature, and texture bilateral.  Toenails x 10 appear normotrophic.  Cicatrix overlying the Rt. 1st ray.   Examination of the skin reveals no evidence of significant maceration, rashes, open lesions, suspicious appearing nevi or other concerning lesions.              Assessment:       Encounter Diagnoses   Name Primary?    Chronic low back pain, unspecified back pain laterality, with sciatica presence unspecified Yes    Extensor tendinitis of foot     Peroneal tendinitis of lower leg, right     Pes planus of both feet     Paresthesias          Plan:       Mindi was seen today for foot pain.    Diagnoses and all orders for this visit:    Chronic low back pain, unspecified back pain laterality, with sciatica presence unspecified  -     Ambulatory Referral to Pain Clinic    Extensor tendinitis of foot  -     diclofenac sodium (VOLTAREN) 1 % Gel; APPLY 4 MG TOPICALLY TO AFFECTED AREA FOUR TIMES A DAY    Peroneal tendinitis of lower leg, right  -     diclofenac sodium (VOLTAREN) 1 % Gel; APPLY 4 MG TOPICALLY TO AFFECTED AREA FOUR TIMES A DAY    Pes planus of both feet    Paresthesias  -     Nerve conduction test; Future  -     EMG - 2 Extremities; Future      I counseled the patient on her conditions, their implications and medical management.    Orders written for a NCV/EMG of bilateral lower extremity.    Referral written for assessment by Pain Management for chronic lower back pain.    Would prescribe tramadol temporarily to address pain, however, patient to see Pain Management following my exam.    Advised to take an oral nsaid  daily to help with inflammation.    Advised to continue application of voltaren gel to the Rt. Peroneal tendon up to 4 times daily.    Explained that bilateral symptoms are likely due to lack of medial longitudinal arch support associated with her flat foot deformity.      Discussed avoidance of barefoot walking, flats, and flip flops as this will exacerbate symptoms.    Discussed wearing shoes with support and insoles to better control bilateral pronation.      RTC in 2 months for follow up.    Juan Bill DPM

## 2017-08-02 ENCOUNTER — LAB VISIT (OUTPATIENT)
Dept: LAB | Facility: HOSPITAL | Age: 57
End: 2017-08-02
Attending: SURGERY
Payer: MEDICARE

## 2017-08-02 DIAGNOSIS — I25.10 CORONARY ATHEROSCLEROSIS OF UNSPECIFIED TYPE OF VESSEL, NATIVE OR GRAFT: ICD-10-CM

## 2017-08-02 DIAGNOSIS — E78.49 FAMILIAL COMBINED HYPERLIPIDEMIA: Primary | ICD-10-CM

## 2017-08-02 DIAGNOSIS — J32.9 UNSPECIFIED SINUSITIS (CHRONIC): ICD-10-CM

## 2017-08-02 DIAGNOSIS — T78.40XA ALLERGIC REACTION: ICD-10-CM

## 2017-08-02 DIAGNOSIS — E03.9 HYPOTHYROIDISM, UNSPECIFIED TYPE: ICD-10-CM

## 2017-08-02 DIAGNOSIS — I10 ESSENTIAL HYPERTENSION, MALIGNANT: ICD-10-CM

## 2017-08-02 LAB
ALBUMIN SERPL BCP-MCNC: 3.5 G/DL
ALP SERPL-CCNC: 68 U/L
ALT SERPL W/O P-5'-P-CCNC: 13 U/L
ANION GAP SERPL CALC-SCNC: 8 MMOL/L
AST SERPL-CCNC: 11 U/L
BASOPHILS # BLD AUTO: 0.03 K/UL
BASOPHILS NFR BLD: 0.5 %
BILIRUB DIRECT SERPL-MCNC: 0.1 MG/DL
BILIRUB SERPL-MCNC: 0.2 MG/DL
BUN SERPL-MCNC: 19 MG/DL
CALCIUM SERPL-MCNC: 8.8 MG/DL
CHLORIDE SERPL-SCNC: 108 MMOL/L
CHOLEST/HDLC SERPL: 3.7 {RATIO}
CO2 SERPL-SCNC: 22 MMOL/L
CREAT SERPL-MCNC: 1 MG/DL
DIFFERENTIAL METHOD: ABNORMAL
EOSINOPHIL # BLD AUTO: 0.1 K/UL
EOSINOPHIL NFR BLD: 1.1 %
ERYTHROCYTE [DISTWIDTH] IN BLOOD BY AUTOMATED COUNT: 15 %
EST. GFR  (AFRICAN AMERICAN): >60 ML/MIN/1.73 M^2
EST. GFR  (NON AFRICAN AMERICAN): >60 ML/MIN/1.73 M^2
ESTIMATED AVG GLUCOSE: 105 MG/DL
GLUCOSE SERPL-MCNC: 104 MG/DL
HBA1C MFR BLD HPLC: 5.3 %
HCT VFR BLD AUTO: 36.3 %
HDL/CHOLESTEROL RATIO: 27.2 %
HDLC SERPL-MCNC: 184 MG/DL
HDLC SERPL-MCNC: 50 MG/DL
HGB BLD-MCNC: 12.6 G/DL
LDLC SERPL CALC-MCNC: 113.8 MG/DL
LYMPHOCYTES # BLD AUTO: 2.6 K/UL
LYMPHOCYTES NFR BLD: 38.9 %
MCH RBC QN AUTO: 29.2 PG
MCHC RBC AUTO-ENTMCNC: 34.7 G/DL
MCV RBC AUTO: 84 FL
MONOCYTES # BLD AUTO: 0.5 K/UL
MONOCYTES NFR BLD: 7 %
NEUTROPHILS # BLD AUTO: 3.4 K/UL
NEUTROPHILS NFR BLD: 52.3 %
NONHDLC SERPL-MCNC: 134 MG/DL
PLATELET # BLD AUTO: 280 K/UL
PMV BLD AUTO: 10.7 FL
POTASSIUM SERPL-SCNC: 4 MMOL/L
PROT SERPL-MCNC: 6.4 G/DL
RBC # BLD AUTO: 4.31 M/UL
SODIUM SERPL-SCNC: 138 MMOL/L
TRIGL SERPL-MCNC: 101 MG/DL
TSH SERPL DL<=0.005 MIU/L-ACNC: 1.88 UIU/ML
TSH SERPL DL<=0.005 MIU/L-ACNC: 1.88 UIU/ML
WBC # BLD AUTO: 6.56 K/UL

## 2017-08-02 PROCEDURE — 84439 ASSAY OF FREE THYROXINE: CPT

## 2017-08-02 PROCEDURE — 83036 HEMOGLOBIN GLYCOSYLATED A1C: CPT

## 2017-08-02 PROCEDURE — 80076 HEPATIC FUNCTION PANEL: CPT

## 2017-08-02 PROCEDURE — 85025 COMPLETE CBC W/AUTO DIFF WBC: CPT

## 2017-08-02 PROCEDURE — 36415 COLL VENOUS BLD VENIPUNCTURE: CPT | Mod: PO

## 2017-08-02 PROCEDURE — 80061 LIPID PANEL: CPT

## 2017-08-02 PROCEDURE — 84443 ASSAY THYROID STIM HORMONE: CPT

## 2017-08-02 PROCEDURE — 80048 BASIC METABOLIC PNL TOTAL CA: CPT

## 2017-08-03 LAB — T4 FREE SERPL-MCNC: 0.99 NG/DL

## 2017-08-24 ENCOUNTER — OFFICE VISIT (OUTPATIENT)
Dept: PHYSICAL MEDICINE AND REHAB | Facility: CLINIC | Age: 57
End: 2017-08-24
Payer: MEDICARE

## 2017-08-24 VITALS — WEIGHT: 191 LBS | BODY MASS INDEX: 30.7 KG/M2 | HEIGHT: 66 IN

## 2017-08-24 DIAGNOSIS — R20.2 PARESTHESIAS: ICD-10-CM

## 2017-08-24 PROCEDURE — 95910 NRV CNDJ TEST 7-8 STUDIES: CPT | Mod: PBBFAC,PO | Performed by: PHYSICAL MEDICINE & REHABILITATION

## 2017-08-24 PROCEDURE — 99999 PR PBB SHADOW E&M-EST. PATIENT-LVL II: CPT | Mod: PBBFAC,,, | Performed by: PHYSICAL MEDICINE & REHABILITATION

## 2017-08-24 PROCEDURE — 99212 OFFICE O/P EST SF 10 MIN: CPT | Mod: PBBFAC,PO | Performed by: PHYSICAL MEDICINE & REHABILITATION

## 2017-08-24 PROCEDURE — 95910 NRV CNDJ TEST 7-8 STUDIES: CPT | Mod: 26,S$PBB,, | Performed by: PHYSICAL MEDICINE & REHABILITATION

## 2017-08-24 PROCEDURE — 95886 MUSC TEST DONE W/N TEST COMP: CPT | Mod: 26,S$PBB,, | Performed by: PHYSICAL MEDICINE & REHABILITATION

## 2017-08-24 PROCEDURE — 95886 MUSC TEST DONE W/N TEST COMP: CPT | Mod: PBBFAC,PO | Performed by: PHYSICAL MEDICINE & REHABILITATION

## 2017-08-24 PROCEDURE — 99499 UNLISTED E&M SERVICE: CPT | Mod: S$PBB,,, | Performed by: PHYSICAL MEDICINE & REHABILITATION

## 2017-08-24 NOTE — LETTER
August 28, 2017      Juan Bill DPM  [address] [facility] [address] [place] [address]           [facility]  [address] [facility] [place] [address]  Phone: [phone]  Fax: [phone]          Patient: Mindi Villalta   MR Number: [id number]   YOB: 1960   Date of Visit: 8/24/2017       Dear Dr. Juan Bill:    Thank you for referring Mindi Villalta to me for evaluation. Attached you will find relevant portions of my assessment and plan of care.    If you have questions, please do not hesitate to call me. I look forward to following Mindi Villalta along with you.    Sincerely,    Russel Arce  CC:  No Recipients    If you would like to receive this communication electronically, please contact [email or web address] or [phone] to request more information on [employer] Link access.    For providers and/or their staff who would like to refer a patient to [facility], please contact us through our one-stop-shop provider referral line, [facility] , at [phone].    If you feel you have received this communication in error or would no longer like to receive these types of communications, please e-mail [email or web address]

## 2017-09-01 NOTE — PROGRESS NOTES
[facility]  [address] [place] [address]             Full Name: Mindi Villalta Gender: Female  Patient ID: [id number]  History: Pt reports a 1 year hx of bilateral foot pain and paresthesias. She also reports chronic low back pain.      Visit Date: 8/24/2017 08:38  Examining Physician: John  Referring Physician: Landy      Sensory NCS      Nerve / Sites Rec. Site Onset Lat Peak Lat NP Amp PP Amp Segments Distance Velocity     ms ms µV µV  cm m/s   L Ulnar - Digit V (Antidromic)      Wrist Dig V 2.86 3.49 13.2 44.9 Wrist - Dig V 14 49   L Sural - Ankle (Calf)      Calf Ankle 3.70 4.58 12.1 7.3 Calf - Ankle 14 38      2 Ankle 3.70 4.58 11.3 3.1      R Sural - Ankle (Calf)      Calf Ankle 3.44 4.17 24.7 7.3 Calf - Ankle 14 41      2 Ankle 3.33 4.17 19.2 0.99      R Superficial peroneal - Ankle      Lat leg Ankle 3.49 4.17 8.9 0.61 Lat leg - Ankle 14 40      2 Ankle 3.65 4.22 13.4 2.1          Motor NCS      Nerve / Sites Muscle Latency Amplitude Amp % Duration Segments Distance Lat Diff Velocity     ms mV % ms  cm ms m/s   L Peroneal - EDB      Ankle EDB 6.98 2.3 100 6.09 Ankle - EDB 8        Fib head EDB 13.39 2.2 96.9 6.25 Fib head - Ankle 27 6.41 42      Pop fossa EDB 13.80 2.7 119  Pop fossa - Fib head 12 0.42 288   R Peroneal - EDB      Ankle EDB 5.47 6.0 100 5.99 Ankle - EDB 8        Fib head EDB 11.93 6.8 113 6.41 Fib head - Ankle 28 6.46 43   L Tibial - AH      Ankle AH 3.54 10.5 100 7.45 Ankle - AH 8        Pop fossa AH 13.39 8.3 79.2 9.01 Pop fossa - Ankle 41 9.84 42   R Tibial - AH      Ankle AH 4.74 9.0 100 6.20 Ankle - AH 8        Pop fossa AH 13.49 8.6 94.7  Pop fossa - Ankle 42 8.75 48   L Peroneal - Tib Ant      Fib Head Tib Ant 2.34 2.5 100 10.83 Fib Head - Tib Ant 7        Pop fossa Tib Ant 4.06 2.4 95.9 10.36 Pop fossa - Fib Head 10 1.72 58       EMG         EMG Summary Table     Spontaneous MUAP Recruitment   Muscle IA Fib PSW Fasc H.F. Amp Dur. PPP Pattern   R. Vastus medialis N  None None None None       R. Tibialis anterior N None None None None       R. Peroneus longus N None None None None       R. Gastrocnemius (Medial head) N None None None None       R. Gluteus medius N None None None None       R. Gluteus jodie N None None None None       R. Lumbar paraspinals N None None None None       L. Vastus medialis N None None None None       L. Tibialis anterior N None None None None       L. Peroneus longus N None None None None       L. Gastrocnemius (Medial head) N None None None None       L. Gluteus medius N None None None None       L. Gluteus jodie N None None None None       L. Lumbar paraspinals N None None None None           Summary    The motor conduction test was performed on 5 nerve(s). The results were normal in 3 nerve(s): R Peroneal - EDB, L Tibial - AH, R Tibial - AH. Findings were unremarkable in 1 nerve(s): L Peroneal - Tib Ant. Results outside the specified normal range were found in 1 nerve(s), as follows:   In the L Peroneal - EDB study  o the take off latency result was increased for Ankle stimulation    The sensory conduction test was performed on 4 nerve(s). The results were normal in 3 nerve(s): R Sural - Ankle (Calf), R Superficial peroneal - Ankle, L Ulnar - Digit V (Antidromic). Results outside the specified normal range were found in 1 nerve(s), as follows:   In the L Sural - Ankle (Calf) study  o the peak latency result was increased for Calf stimulation    The needle EMG study was normal in all 14 tested muscles: R. Vastus medialis, R. Tibialis anterior, R. Peroneus longus, R. Gastrocnemius (Medial head), R. Gluteus medius, R. Gluteus jodie, R. Lumbar paraspinals, L. Vastus medialis, L. Tibialis anterior, L. Peroneus longus, L. Gastrocnemius (Medial head), L. Gluteus medius, L. Gluteus jodie, L. Lumbar paraspinals.      Electrodiagnostic Impression:  1. Electrodiagnostic test results are somewhat difficult to interpret.  The only abnormalities were  mildly prolonged latency responses in the left sural and left peroneal nerves on nerve conduction studies.  Further electrodiagnostic testing however did not reveal localizing will pathology, as the results were not consistent with generalized peripheral polyneuropathy or focal peroneal nerve compression at the left fibular head.  Furthermore, these findings do not seem to fit her clinical symptoms.  With this in mind, technical error may be considered.  Clinical correlation recommended.  2. The patient also had difficulty tolerating the needle portion of the EMG, hindering motor unit analysis, however there were no signs of active axonal denervation seen in the bilateral lower extremities.  3. There was insufficient electrodiagnostic evidence for diagnoses of peripheral polyneuropathy, myopathy, or lumbosacral radiculopathy/plexopathy of the bilateral lower extremities.    Summary:  1. Isolated mildly prolonged latencies of the left sural sensory and peroneal motor responses.  Clinical correlation recommended.  2. Poor patient tolerance to needle exam.    Recommendations:  As noted above, this electrodiagnostic test is somewhat difficult to interpret.  Findings could indicate isolated left peroneal and sural neuropathies, or mononeuritis multiplex, however these diagnoses do not seem to fit her clinical symptoms.  Electrodiagnostic testing today does lower the potential presence of active axonal lumbosacral radiculopathy of the bilateral lower extremities, as well as generalized peripheral polyneuropathy.  If the patients clinical symptoms do not correlate with isolated left peroneal and sural neuropathies, technical error of todays test should be considered.  Todays test results will be sent to her referring provider, Dr. Bill for further review and direction in her treatment.  If her condition does worsen over the next several months, repeat electrodiagnostic testing may be considered after 6  months.    Thank you very much for the referral. Please call if you have any questions regarding this study or the report.       -------------------------------  Carlos Lopez M.D.

## 2017-09-08 ENCOUNTER — TELEPHONE (OUTPATIENT)
Dept: PODIATRY | Facility: CLINIC | Age: 57
End: 2017-09-08

## 2017-09-08 NOTE — TELEPHONE ENCOUNTER
----- Message from Juan Bill DPM sent at 9/8/2017  6:59 AM CDT -----  Hey Ladies,    Please call the patient and inform that she may have some neuropathy along the Lt. Lateral foot, however, this does not explain the Rt. Sided pain she had on exam.  She does have a past history of degenerative disc disease, specifically in the lumbar spine, which is likely the cause of current bilateral lower extremity pain.  The rest of the NCV test was inconclusive, as she was unable to tolerate the test in its entirety.  I'm afraid that this limits my ability to help her.  She will have to follow up with her PCP, so that her pain can be managed appropriately.  Thanks!    Dr. Bill

## 2017-09-08 NOTE — TELEPHONE ENCOUNTER
Spoke with patient, all instructions given per , patient states she will keep her appointment in October .

## 2017-09-22 ENCOUNTER — PATIENT OUTREACH (OUTPATIENT)
Dept: ADMINISTRATIVE | Facility: HOSPITAL | Age: 57
End: 2017-09-22

## 2017-09-22 NOTE — LETTER
September 22, 2017    Mindi Villalta  [address]  [place] [address]             [facility]  [address]  [facility] [address]  Phone: [phone] Dear Ms. Villalta:    [facility] is committed to your overall health.  To help you get the most out of each of your visits, we will review your information to make sure you are up to date on all of your recommended tests and/or procedures.      Dr. Jacob        has found that you may be due for:    Pap smear with HPV Cotest  Influenza vaccine  Mammogram    If you have had any of the above done at another facility, please bring the records or information with you so that your record at [facility] will be complete.     If you are currently taking medication, please bring it with you to your appointment for review.    If you have any questions or concerns, please don't hesitate to call.    Sincerely,    Lauren Pop  Clinical Care Coordinator  [facility] [address] [facility] [address]  [place] [address]  Phone: [phone]   Fax: [phone]

## 2017-10-02 ENCOUNTER — OFFICE VISIT (OUTPATIENT)
Dept: PODIATRY | Facility: CLINIC | Age: 57
End: 2017-10-02
Payer: MEDICARE

## 2017-10-02 VITALS — HEIGHT: 66 IN | BODY MASS INDEX: 30.86 KG/M2 | WEIGHT: 192 LBS

## 2017-10-02 DIAGNOSIS — M51.36 DDD (DEGENERATIVE DISC DISEASE), LUMBAR: Primary | ICD-10-CM

## 2017-10-02 DIAGNOSIS — R20.2 PARESTHESIA OF FOOT, BILATERAL: ICD-10-CM

## 2017-10-02 PROCEDURE — 99999 PR PBB SHADOW E&M-EST. PATIENT-LVL III: CPT | Mod: PBBFAC,,, | Performed by: PODIATRIST

## 2017-10-02 PROCEDURE — 99213 OFFICE O/P EST LOW 20 MIN: CPT | Mod: PBBFAC,PO | Performed by: PODIATRIST

## 2017-10-02 PROCEDURE — 99213 OFFICE O/P EST LOW 20 MIN: CPT | Mod: S$PBB,,, | Performed by: PODIATRIST

## 2017-10-02 RX ORDER — CITALOPRAM 20 MG/1
TABLET, FILM COATED ORAL
COMMUNITY
Start: 2017-08-25 | End: 2023-04-28

## 2017-10-02 RX ORDER — TRAMADOL HYDROCHLORIDE 50 MG/1
50 TABLET ORAL
Qty: 30 TABLET | Refills: 0 | Status: SHIPPED | OUTPATIENT
Start: 2017-10-02 | End: 2021-03-22 | Stop reason: ALTCHOICE

## 2017-10-03 NOTE — PROGRESS NOTES
Subjective:      Patient ID: Mindi Villalta is a 57 y.o. female.    Chief Complaint: Foot Pain (b/l )  Patient presents to clinic with the chief complaint of continued diffuse pain in bilateral foot and ankle.  Patient states that the pain is severe and prevents her from ambulating in a normal fashion.  She was seen by Pain Management at the [facility], however, she is opposed to most interventions to help with pain.  Describes pain as sharp and burning and exacerbated both while at rest and with weight bearing.  She has been applying voltaren gel which has helped to lessen symptoms but does not fully alleviate them.  She has yet to get established with a new PCP, as her previous doctor has since retired.  States that while under a pain contract, she was able to better control symptoms with medication.  Patient still believes that symptoms stem from prior lower back issues.  Requests that a new MRI of her lumbar spine be considered.  Denies any additional pedal complaints.       Past Medical History:   Diagnosis Date    Arthritis     back, chronic, wears TENS unit    Bradycardia July 2014    improved when thyroid med increased    Colon cancer screening 1/12/2015    Depression     GERD (gastroesophageal reflux disease)     occasional    Hyperlipidemia     Hypertension     on no medication for it    Smoker     Thyroid disease     hypothyroidism       Past Surgical History:   Procedure Laterality Date    COLONOSCOPY  1/12/2015    FOOT SURGERY      left foot great toe, bunion; right foot bunion 7/2014    FRACTURE SURGERY      right elbow    TONSILLECTOMY      TUBAL LIGATION         Family History   Problem Relation Age of Onset    Diabetes Mother     Arthritis Father     Heart disease Father     Stroke Father     Diabetes Maternal Aunt        Social History     Social History    Marital status:      Spouse name: N/A    Number of children: N/A    Years of education: N/A     Social History  Main Topics    Smoking status: Current Every Day Smoker     Packs/day: 0.50     Types: Cigarettes    Smokeless tobacco: Never Used      Comment: smoke a pack of cigarettes every 2 days    Alcohol use Yes      Comment: rarely    Drug use:      Types: Hydrocodone    Sexual activity: Not Asked     Other Topics Concern    None     Social History Narrative    None       Current Outpatient Prescriptions   Medication Sig Dispense Refill    buPROPion (WELLBUTRIN XL) 300 MG 24 hr tablet TAKE ONE TABLET BY MOUTH EVERY DAY 30 tablet 6    citalopram (CELEXA) 20 MG tablet       diclofenac sodium (VOLTAREN) 1 % Gel APPLY 4 MG TOPICALLY TO AFFECTED AREA FOUR TIMES A  g 5    escitalopram oxalate (LEXAPRO) 20 MG tablet TAKE ONE TABLET BY MOUTH EVERY DAY FOR MOOD STABILIZER 30 tablet 11    ibuprofen (ADVIL,MOTRIN) 600 MG tablet TAKE ONE TABLET BY MOUTH EVERY 6 HOURS AS NEEDED FOR PAIN AND INFLAMMATION 60 tablet 0    levothyroxine (SYNTHROID) 100 MCG tablet Take 1 tablet (100 mcg total) by mouth once daily. 30 tablet 11    multivitamin capsule Take 1 capsule by mouth once daily.      NEXIUM 40 mg capsule TAKE ONE CAPSULE BY MOUTH EVERY DAY BEFORE BREAKFAST 30 capsule 2    pravastatin (PRAVACHOL) 20 MG tablet TAKE ONE TABLET BY MOUTH EVERY EVENING FOR CHOLESTEROL 30 tablet 1    TENS units Humaira 1 Units by Misc.(Non-Drug; Combo Route) route 4 (four) times daily as needed. 1 Device 0    trazodone (DESYREL) 50 MG tablet TAKE TWO TABLETS BY MOUTH EVERY EVENING 60 tablet 3    triamcinolone acetonide 0.1% (KENALOG) 0.1 % cream APPLY TO AFFECTED AREA TWO TIMES A  Bottle 1    tramadol (ULTRAM) 50 mg tablet TAKE ONE TABLET BY MOUTH EVERY 6 HOURS AS NEEDED 60 tablet 0    tramadol (ULTRAM) 50 mg tablet Take 1 tablet (50 mg total) by mouth every 24 hours as needed. 30 tablet 0     No current facility-administered medications for this visit.        Review of patient's allergies indicates:   Allergen Reactions     Pcn [penicillins] Rash         Review of Systems   Constitution: Negative for chills and fever.   Cardiovascular: Negative for claudication and leg swelling.   Skin: Negative for color change and nail changes.   Musculoskeletal: Positive for arthritis, back pain, joint pain and myalgias.   Neurological: Positive for paresthesias. Negative for numbness.   Psychiatric/Behavioral: Negative for altered mental status.           Objective:      Physical Exam   Constitutional: She is oriented to person, place, and time. She appears well-developed and well-nourished. No distress.   Cardiovascular:   Pulses:       Dorsalis pedis pulses are 2+ on the right side, and 2+ on the left side.        Posterior tibial pulses are 2+ on the right side, and 2+ on the left side.   CFT <3 seconds bilateral.  Pedal hair growth present bilateral.   No varicosities noted bilateral.  No bilateral lower extremity edema.   Musculoskeletal: She exhibits tenderness. She exhibits no edema.   Muscle strength 5/5 in all muscle groups bilateral.   Bilateral pes planus foot type.  Lt. Hallux varus. Semi-rigid contracture of toes 2-5 bilateral.  Rectus alignment of the Rt. Hallux.  Diffuse pain on exam with inability to isolate pain symptoms.  Guarding on exam on account of pain.   Neurological: She is oriented to person, place, and time. She has normal strength. A sensory deficit is present.   Light touch hypersensitivity bilateral.    (+) Tinel sign of bilateral lower extremity.       Skin: Skin is warm, dry and intact. No abrasion, no bruising, no burn, no ecchymosis, no laceration, no lesion, no petechiae and no rash noted. She is not diaphoretic. No cyanosis or erythema. No pallor. Nails show no clubbing.   Pedal skin has normal turgor, temperature, and texture bilateral.  Toenails x 10 appear normotrophic.  Cicatrix overlying the Rt. 1st ray.   Examination of the skin reveals no evidence of significant maceration, rashes, open lesions, suspicious  appearing nevi or other concerning lesions.              Assessment:       Encounter Diagnoses   Name Primary?    DDD (degenerative disc disease), lumbar Yes    Paresthesia of foot, bilateral          Plan:       Mindi was seen today for foot pain.    Diagnoses and all orders for this visit:    DDD (degenerative disc disease), lumbar  -     Ambulatory Referral to Pain Clinic    Paresthesia of foot, bilateral  -     tramadol (ULTRAM) 50 mg tablet; Take 1 tablet (50 mg total) by mouth every 24 hours as needed.  -     Ambulatory Referral to Pain Clinic      I counseled the patient on her conditions, their implications and medical management.    Strong suspicion that pain is originating from the lower back based on patient's descriptions and my inability to localize pathology.  I initially thought she was suffering from tendonitis of the lower extremities, however, symptoms are disproportionate.    Referral written for assessment by Pain Management, Dr. Andrade,  for chronic lower back pain.    Prescription written for tramadol until she can be seen at the pain clinic.    Advised to continue RICE.    Advised to continue with application of voltaren gel.    Discussed avoidance of barefoot walking, flats, and flip flops as this will exacerbate symptoms.    Discussed wearing shoes with support and insoles to better control bilateral pronation.      RTC prn.    Juan Bill DPM

## 2017-10-24 ENCOUNTER — TELEPHONE (OUTPATIENT)
Dept: FAMILY MEDICINE | Facility: CLINIC | Age: 57
End: 2017-10-24

## 2017-10-24 DIAGNOSIS — M81.0 OSTEOPOROSIS, POSTMENOPAUSAL: ICD-10-CM

## 2017-10-24 DIAGNOSIS — Z78.0 POST-MENOPAUSAL: ICD-10-CM

## 2017-10-24 DIAGNOSIS — Z12.31 ENCOUNTER FOR SCREENING MAMMOGRAM FOR BREAST CANCER: Primary | ICD-10-CM

## 2017-10-24 DIAGNOSIS — Z13.820 SCREENING FOR OSTEOPOROSIS: ICD-10-CM

## 2017-10-24 NOTE — TELEPHONE ENCOUNTER
Patient needs order for Bone Density exam. Pended order. Patient to establish care on 3/1/2017. Please advise.

## 2017-11-08 ENCOUNTER — HOSPITAL ENCOUNTER (OUTPATIENT)
Dept: RADIOLOGY | Facility: HOSPITAL | Age: 57
Discharge: HOME | End: 2017-11-08
Attending: INTERNAL MEDICINE
Payer: MEDICARE

## 2017-11-08 DIAGNOSIS — Z12.31 ENCOUNTER FOR SCREENING MAMMOGRAM FOR BREAST CANCER: ICD-10-CM

## 2017-11-08 DIAGNOSIS — Z78.0 POST-MENOPAUSAL: ICD-10-CM

## 2017-11-08 PROCEDURE — 77067 SCR MAMMO BI INCL CAD: CPT | Mod: 26,,, | Performed by: RADIOLOGY

## 2017-11-08 PROCEDURE — 77080 DXA BONE DENSITY AXIAL: CPT | Mod: 26,,, | Performed by: RADIOLOGY

## 2017-11-08 PROCEDURE — 77063 BREAST TOMOSYNTHESIS BI: CPT | Mod: 26,,, | Performed by: RADIOLOGY

## 2017-11-08 PROCEDURE — 77067 SCR MAMMO BI INCL CAD: CPT | Mod: TC

## 2017-11-08 PROCEDURE — 77080 DXA BONE DENSITY AXIAL: CPT | Mod: TC,PO

## 2018-02-15 ENCOUNTER — PATIENT OUTREACH (OUTPATIENT)
Dept: ADMINISTRATIVE | Facility: HOSPITAL | Age: 58
End: 2018-02-15

## 2018-02-15 NOTE — LETTER
February 15, 2018    Mindi Villalta  [address]  [place] [address]             [facility]  [address]  [facility] [address]  Phone: [phone] Dear Ms. Villalta:    [facility] is committed to your overall health.  To help you get the most out of each of your visits, we will review your information to make sure you are up to date on all of your recommended tests and/or procedures.      Dr. Jacob       has found that you may be due for:    Influenza vaccine  Pap smear with HPV Cotest      If you have had any of the above done at another facility, please bring the records or information with you so that your record at [facility] will be complete.     If you are currently taking medication, please bring it with you to your appointment for review.    If you have any questions or concerns, please don't hesitate to call.    Sincerely,    Lauren Pop  Clinical Care Coordinator  [facility] [address] [facility] [address]  [place] [address]  Phone: [phone]   Fax: [phone]

## 2018-02-15 NOTE — PROGRESS NOTES
Health Maintenance Due   Topic Date Due    Pap Smear with HPV Cotest  04/14/1981    Influenza Vaccine  08/01/2017     Pre-visit outreach via mail

## 2018-04-04 ENCOUNTER — LAB VISIT (OUTPATIENT)
Dept: LAB | Facility: HOSPITAL | Age: 58
End: 2018-04-04
Attending: FAMILY MEDICINE
Payer: MEDICARE

## 2018-04-04 DIAGNOSIS — I10 ESSENTIAL HYPERTENSION, MALIGNANT: Primary | ICD-10-CM

## 2018-04-04 LAB
ALBUMIN SERPL BCP-MCNC: 3.6 G/DL
ALP SERPL-CCNC: 65 U/L
ALT SERPL W/O P-5'-P-CCNC: 23 U/L
ANION GAP SERPL CALC-SCNC: 9 MMOL/L
AST SERPL-CCNC: 20 U/L
BILIRUB SERPL-MCNC: 0.4 MG/DL
BUN SERPL-MCNC: 23 MG/DL
CALCIUM SERPL-MCNC: 9 MG/DL
CHLORIDE SERPL-SCNC: 108 MMOL/L
CO2 SERPL-SCNC: 22 MMOL/L
CREAT SERPL-MCNC: 1 MG/DL
EST. GFR  (AFRICAN AMERICAN): >60 ML/MIN/1.73 M^2
EST. GFR  (NON AFRICAN AMERICAN): >60 ML/MIN/1.73 M^2
GLUCOSE SERPL-MCNC: 100 MG/DL
POTASSIUM SERPL-SCNC: 4.8 MMOL/L
PROT SERPL-MCNC: 6.5 G/DL
SODIUM SERPL-SCNC: 139 MMOL/L
TSH SERPL DL<=0.005 MIU/L-ACNC: 2.08 UIU/ML

## 2018-04-04 PROCEDURE — 84443 ASSAY THYROID STIM HORMONE: CPT

## 2018-04-04 PROCEDURE — 36415 COLL VENOUS BLD VENIPUNCTURE: CPT | Mod: PO

## 2018-04-04 PROCEDURE — 80053 COMPREHEN METABOLIC PANEL: CPT

## 2018-10-24 ENCOUNTER — LAB VISIT (OUTPATIENT)
Dept: LAB | Facility: HOSPITAL | Age: 58
End: 2018-10-24
Attending: FAMILY MEDICINE
Payer: MEDICARE

## 2018-10-24 DIAGNOSIS — E03.9 MYXEDEMA HEART DISEASE: Primary | ICD-10-CM

## 2018-10-24 DIAGNOSIS — I51.9 MYXEDEMA HEART DISEASE: Primary | ICD-10-CM

## 2018-10-24 LAB
ALBUMIN SERPL BCP-MCNC: 4.2 G/DL
ALP SERPL-CCNC: 72 U/L
ALT SERPL W/O P-5'-P-CCNC: 20 U/L
ANION GAP SERPL CALC-SCNC: 10 MMOL/L
AST SERPL-CCNC: 18 U/L
BILIRUB SERPL-MCNC: 0.5 MG/DL
BUN SERPL-MCNC: 14 MG/DL
CALCIUM SERPL-MCNC: 10.1 MG/DL
CHLORIDE SERPL-SCNC: 103 MMOL/L
CO2 SERPL-SCNC: 25 MMOL/L
CREAT SERPL-MCNC: 0.9 MG/DL
EST. GFR  (AFRICAN AMERICAN): >60 ML/MIN/1.73 M^2
EST. GFR  (NON AFRICAN AMERICAN): >60 ML/MIN/1.73 M^2
GLUCOSE SERPL-MCNC: 106 MG/DL
POTASSIUM SERPL-SCNC: 3.4 MMOL/L
PROT SERPL-MCNC: 7.4 G/DL
SODIUM SERPL-SCNC: 138 MMOL/L
TSH SERPL DL<=0.005 MIU/L-ACNC: 1.64 UIU/ML

## 2018-10-24 PROCEDURE — 80053 COMPREHEN METABOLIC PANEL: CPT

## 2018-10-24 PROCEDURE — 84443 ASSAY THYROID STIM HORMONE: CPT

## 2018-10-24 PROCEDURE — 36415 COLL VENOUS BLD VENIPUNCTURE: CPT | Mod: PO

## 2019-04-24 ENCOUNTER — LAB VISIT (OUTPATIENT)
Dept: LAB | Facility: HOSPITAL | Age: 59
End: 2019-04-24
Attending: FAMILY MEDICINE
Payer: MEDICARE

## 2019-04-24 DIAGNOSIS — E03.9 MYXEDEMA HEART DISEASE: Primary | ICD-10-CM

## 2019-04-24 DIAGNOSIS — I51.9 MYXEDEMA HEART DISEASE: Primary | ICD-10-CM

## 2019-04-24 LAB
ALBUMIN SERPL BCP-MCNC: 3.8 G/DL (ref 3.5–5.2)
ALP SERPL-CCNC: 96 U/L (ref 55–135)
ALT SERPL W/O P-5'-P-CCNC: 29 U/L (ref 10–44)
ANION GAP SERPL CALC-SCNC: 9 MMOL/L (ref 8–16)
AST SERPL-CCNC: 24 U/L (ref 10–40)
BILIRUB SERPL-MCNC: 0.3 MG/DL (ref 0.1–1)
BUN SERPL-MCNC: 20 MG/DL (ref 6–20)
CALCIUM SERPL-MCNC: 9.6 MG/DL (ref 8.7–10.5)
CHLORIDE SERPL-SCNC: 106 MMOL/L (ref 95–110)
CO2 SERPL-SCNC: 26 MMOL/L (ref 23–29)
CREAT SERPL-MCNC: 0.9 MG/DL (ref 0.5–1.4)
EST. GFR  (AFRICAN AMERICAN): >60 ML/MIN/1.73 M^2
EST. GFR  (NON AFRICAN AMERICAN): >60 ML/MIN/1.73 M^2
GLUCOSE SERPL-MCNC: 94 MG/DL (ref 70–110)
POTASSIUM SERPL-SCNC: 4.8 MMOL/L (ref 3.5–5.1)
PROT SERPL-MCNC: 6.7 G/DL (ref 6–8.4)
SODIUM SERPL-SCNC: 141 MMOL/L (ref 136–145)
TSH SERPL DL<=0.005 MIU/L-ACNC: 1.65 UIU/ML (ref 0.4–4)

## 2019-04-24 PROCEDURE — 84443 ASSAY THYROID STIM HORMONE: CPT

## 2019-04-24 PROCEDURE — 80053 COMPREHEN METABOLIC PANEL: CPT

## 2019-04-24 PROCEDURE — 36415 COLL VENOUS BLD VENIPUNCTURE: CPT | Mod: PO

## 2020-11-05 ENCOUNTER — LAB VISIT (OUTPATIENT)
Dept: LAB | Facility: HOSPITAL | Age: 60
End: 2020-11-05
Attending: FAMILY MEDICINE
Payer: MEDICARE

## 2020-11-05 DIAGNOSIS — I10 ESSENTIAL HYPERTENSION, MALIGNANT: ICD-10-CM

## 2020-11-05 DIAGNOSIS — G89.4 CHRONIC PAIN SYNDROME: Primary | ICD-10-CM

## 2020-11-05 LAB
ALBUMIN SERPL BCP-MCNC: 3.7 G/DL (ref 3.5–5.2)
ALP SERPL-CCNC: 71 U/L (ref 55–135)
ALT SERPL W/O P-5'-P-CCNC: 15 U/L (ref 10–44)
ANION GAP SERPL CALC-SCNC: 8 MMOL/L (ref 8–16)
AST SERPL-CCNC: 16 U/L (ref 10–40)
BILIRUB SERPL-MCNC: 0.3 MG/DL (ref 0.1–1)
BUN SERPL-MCNC: 16 MG/DL (ref 6–20)
CALCIUM SERPL-MCNC: 10 MG/DL (ref 8.7–10.5)
CHLORIDE SERPL-SCNC: 105 MMOL/L (ref 95–110)
CHOLEST SERPL-MCNC: 208 MG/DL (ref 120–199)
CHOLEST/HDLC SERPL: 5 {RATIO} (ref 2–5)
CO2 SERPL-SCNC: 27 MMOL/L (ref 23–29)
CREAT SERPL-MCNC: 1.1 MG/DL (ref 0.5–1.4)
EST. GFR  (AFRICAN AMERICAN): >60 ML/MIN/1.73 M^2
EST. GFR  (NON AFRICAN AMERICAN): 54.7 ML/MIN/1.73 M^2
GLUCOSE SERPL-MCNC: 95 MG/DL (ref 70–110)
HDLC SERPL-MCNC: 42 MG/DL (ref 40–75)
HDLC SERPL: 20.2 % (ref 20–50)
LDLC SERPL CALC-MCNC: 136 MG/DL (ref 63–159)
NONHDLC SERPL-MCNC: 166 MG/DL
POTASSIUM SERPL-SCNC: 4.7 MMOL/L (ref 3.5–5.1)
PROT SERPL-MCNC: 6.5 G/DL (ref 6–8.4)
SODIUM SERPL-SCNC: 140 MMOL/L (ref 136–145)
T4 FREE SERPL-MCNC: 0.78 NG/DL (ref 0.71–1.51)
TRIGL SERPL-MCNC: 150 MG/DL (ref 30–150)
TSH SERPL DL<=0.005 MIU/L-ACNC: 11.39 UIU/ML (ref 0.4–4)

## 2020-11-05 PROCEDURE — 84439 ASSAY OF FREE THYROXINE: CPT

## 2020-11-05 PROCEDURE — 36415 COLL VENOUS BLD VENIPUNCTURE: CPT | Mod: PO

## 2020-11-05 PROCEDURE — 84443 ASSAY THYROID STIM HORMONE: CPT

## 2020-11-05 PROCEDURE — 80053 COMPREHEN METABOLIC PANEL: CPT

## 2020-11-05 PROCEDURE — 80061 LIPID PANEL: CPT

## 2020-11-05 PROCEDURE — 80307 DRUG TEST PRSMV CHEM ANLYZR: CPT

## 2020-11-10 LAB
6MAM UR QL: NOT DETECTED
7AMINOCLONAZEPAM UR QL: NOT DETECTED
A-OH ALPRAZ UR QL: NOT DETECTED
ALPRAZ UR QL: NOT DETECTED
AMPHET UR QL SCN: NOT DETECTED
ANNOTATION COMMENT IMP: NORMAL
ANNOTATION COMMENT IMP: NORMAL
BARBITURATES UR QL: NOT DETECTED
BUPRENORPHINE UR QL: NOT DETECTED
BZE UR QL: NOT DETECTED
CARBOXYTHC UR QL: PRESENT
CARISOPRODOL UR QL: NOT DETECTED
CLONAZEPAM UR QL: NOT DETECTED
CODEINE UR QL: NOT DETECTED
CREAT UR-MCNC: 74 MG/DL (ref 20–400)
DIAZEPAM UR QL: NOT DETECTED
ETHYL GLUCURONIDE UR QL: NOT DETECTED
FENTANYL UR QL: NOT DETECTED
HYDROCODONE UR QL: PRESENT
HYDROMORPHONE UR QL: NOT DETECTED
LORAZEPAM UR QL: NOT DETECTED
MDA UR QL: NOT DETECTED
MDEA UR QL: NOT DETECTED
MDMA UR QL: NOT DETECTED
ME-PHENIDATE UR QL: NOT DETECTED
MEPERIDINE UR QL: NOT DETECTED
METHADONE UR QL: NOT DETECTED
METHAMPHET UR QL: NOT DETECTED
MIDAZOLAM UR QL SCN: NOT DETECTED
MORPHINE UR QL: NOT DETECTED
NORBUPRENORPHINE UR QL CFM: NOT DETECTED
NORDIAZEPAM UR QL: NOT DETECTED
NORFENTANYL UR QL: NOT DETECTED
NORHYDROCODONE UR QL CFM: PRESENT
NOROXYCODONE UR QL CFM: NOT DETECTED
NOROXYMORPHONE: NOT DETECTED
OXAZEPAM UR QL: NOT DETECTED
OXYCODONE UR QL: NOT DETECTED
OXYMORPHONE UR QL: NOT DETECTED
PATHOLOGY STUDY: NORMAL
PCP UR QL: NOT DETECTED
PHENTERMINE UR QL: NOT DETECTED
PROPOXYPH UR QL: NOT DETECTED
SERVICE CMNT-IMP: NORMAL
TAPENTADOL UR QL SCN: NOT DETECTED
TAPENTADOL-O-SULF: NOT DETECTED
TEMAZEPAM UR QL: NOT DETECTED
TRAMADOL UR QL: PRESENT
ZOLPIDEM UR QL: NOT DETECTED

## 2021-02-18 ENCOUNTER — HOSPITAL ENCOUNTER (OUTPATIENT)
Dept: RADIOLOGY | Facility: HOSPITAL | Age: 61
Discharge: HOME | End: 2021-02-18
Attending: FAMILY MEDICINE
Payer: MEDICARE

## 2021-02-18 DIAGNOSIS — G89.4 CHRONIC PAIN SYNDROME: ICD-10-CM

## 2021-02-18 DIAGNOSIS — M19.041 PRIMARY OSTEOARTHRITIS OF BOTH HANDS: ICD-10-CM

## 2021-02-18 DIAGNOSIS — M19.042 PRIMARY OSTEOARTHRITIS OF BOTH HANDS: ICD-10-CM

## 2021-02-18 PROCEDURE — 73130 X-RAY EXAM OF HAND: CPT | Mod: TC,FY,PO,RT

## 2021-02-18 PROCEDURE — 73030 X-RAY EXAM OF SHOULDER: CPT | Mod: TC,FY,PO,RT

## 2021-02-18 PROCEDURE — 73130 XR HAND COMPLETE 3 VIEW RIGHT: ICD-10-PCS | Mod: 26,RT,, | Performed by: RADIOLOGY

## 2021-02-18 PROCEDURE — 73030 XR SHOULDER COMPLETE 2 OR MORE VIEWS RIGHT: ICD-10-PCS | Mod: 26,RT,, | Performed by: RADIOLOGY

## 2021-02-18 PROCEDURE — 73030 X-RAY EXAM OF SHOULDER: CPT | Mod: 26,RT,, | Performed by: RADIOLOGY

## 2021-02-18 PROCEDURE — 73130 X-RAY EXAM OF HAND: CPT | Mod: 26,RT,, | Performed by: RADIOLOGY

## 2021-05-03 ENCOUNTER — IMMUNIZATION (OUTPATIENT)
Dept: PHARMACY | Facility: CLINIC | Age: 61
End: 2021-05-03
Payer: MEDICARE

## 2021-05-03 DIAGNOSIS — Z23 NEED FOR VACCINATION: Primary | ICD-10-CM

## 2021-05-31 ENCOUNTER — IMMUNIZATION (OUTPATIENT)
Dept: PHARMACY | Facility: CLINIC | Age: 61
End: 2021-05-31
Payer: MEDICARE

## 2021-05-31 DIAGNOSIS — Z23 NEED FOR VACCINATION: Primary | ICD-10-CM

## 2021-07-01 ENCOUNTER — PATIENT MESSAGE (OUTPATIENT)
Dept: ADMINISTRATIVE | Facility: OTHER | Age: 61
End: 2021-07-01

## 2021-11-24 RX ORDER — HYDROCODONE BITARTRATE AND ACETAMINOPHEN 10; 325 MG/1; MG/1
1 TABLET ORAL 3 TIMES DAILY PRN
Qty: 90 TABLET | Refills: 0 | Status: CANCELLED | OUTPATIENT
Start: 2021-11-24

## 2021-12-06 ENCOUNTER — IMMUNIZATION (OUTPATIENT)
Dept: PHARMACY | Facility: CLINIC | Age: 61
End: 2021-12-06
Payer: MEDICARE

## 2021-12-06 DIAGNOSIS — Z23 NEED FOR VACCINATION: Primary | ICD-10-CM

## 2021-12-29 RX ORDER — HYDROCODONE BITARTRATE AND ACETAMINOPHEN 10; 325 MG/1; MG/1
TABLET ORAL
Qty: 90 TABLET | Refills: 0 | Status: CANCELLED | OUTPATIENT
Start: 2021-12-29

## 2022-01-28 RX ORDER — HYDROCODONE BITARTRATE AND ACETAMINOPHEN 10; 325 MG/1; MG/1
1 TABLET ORAL 3 TIMES DAILY PRN
Qty: 90 TABLET | Refills: 0 | OUTPATIENT
Start: 2022-01-28

## 2023-01-27 ENCOUNTER — TELEPHONE (OUTPATIENT)
Dept: PHARMACY | Facility: CLINIC | Age: 63
End: 2023-01-27
Payer: MEDICARE

## 2023-01-27 NOTE — TELEPHONE ENCOUNTER
DOCUMENTATION ONLY  Pantoprazole Sodium 40mg DR tablets  Approval date: 1/25/2023 to 12/31/2023   Case ID# [id number]

## 2023-04-27 PROBLEM — K21.00 GASTROESOPHAGEAL REFLUX DISEASE WITH ESOPHAGITIS: Status: ACTIVE | Noted: 2017-12-13

## 2023-04-27 PROBLEM — F32.A DEPRESSION: Status: ACTIVE | Noted: 2017-12-13

## 2023-04-27 PROBLEM — G89.4 CHRONIC PAIN SYNDROME: Status: ACTIVE | Noted: 2017-12-13

## 2023-04-27 PROBLEM — E03.9 ACQUIRED HYPOTHYROIDISM: Status: ACTIVE | Noted: 2017-12-13

## 2023-04-27 PROBLEM — M79.7 FIBROMYALGIA: Status: ACTIVE | Noted: 2021-02-04

## 2023-04-27 PROBLEM — I10 ESSENTIAL HYPERTENSION: Status: ACTIVE | Noted: 2017-12-13

## 2023-04-27 PROBLEM — E66.01 OBESITY, MORBID: Status: ACTIVE | Noted: 2021-12-29

## 2023-04-27 PROBLEM — E78.2 MIXED HYPERLIPIDEMIA: Status: ACTIVE | Noted: 2017-12-13

## 2023-04-28 PROBLEM — F41.9 ANXIETY: Status: ACTIVE | Noted: 2023-04-28

## 2023-04-28 PROBLEM — F99 INSOMNIA DUE TO OTHER MENTAL DISORDER: Status: ACTIVE | Noted: 2023-04-28

## 2023-04-28 PROBLEM — F51.05 INSOMNIA DUE TO OTHER MENTAL DISORDER: Status: ACTIVE | Noted: 2023-04-28

## 2023-04-28 PROBLEM — G62.9 POLYNEUROPATHY: Status: ACTIVE | Noted: 2023-04-28

## 2024-02-21 PROBLEM — S82.841A BIMALLEOLAR ANKLE FRACTURE, RIGHT, CLOSED, INITIAL ENCOUNTER: Status: ACTIVE | Noted: 2024-02-21

## 2024-02-22 PROBLEM — R00.1 ASYMPTOMATIC BRADYCARDIA: Status: ACTIVE | Noted: 2024-02-22

## 2024-02-22 PROBLEM — M79.7 FIBROMYALGIA: Status: RESOLVED | Noted: 2021-02-04 | Resolved: 2024-02-22

## 2025-04-17 ENCOUNTER — HOSPITAL ENCOUNTER (OUTPATIENT)
Dept: RADIOLOGY | Facility: HOSPITAL | Age: 65
Discharge: HOME | End: 2025-04-17
Attending: NURSE PRACTITIONER
Payer: MEDICARE

## 2025-04-17 DIAGNOSIS — Z87.81 HX OF FRACTURE OF ANKLE: ICD-10-CM

## 2025-04-17 DIAGNOSIS — Z78.0 ASYMPTOMATIC MENOPAUSAL STATE: ICD-10-CM

## 2025-04-17 PROCEDURE — 77080 DXA BONE DENSITY AXIAL: CPT | Mod: 26,,, | Performed by: RADIOLOGY

## 2025-04-17 PROCEDURE — 77080 DXA BONE DENSITY AXIAL: CPT | Mod: TC,PO
